# Patient Record
Sex: MALE | Race: WHITE | Employment: OTHER | URBAN - METROPOLITAN AREA
[De-identification: names, ages, dates, MRNs, and addresses within clinical notes are randomized per-mention and may not be internally consistent; named-entity substitution may affect disease eponyms.]

---

## 2023-05-13 ENCOUNTER — HOSPITAL ENCOUNTER (OUTPATIENT)
Dept: RADIOLOGY | Facility: MEDICAL CENTER | Age: 48
End: 2023-05-13

## 2024-05-08 ENCOUNTER — APPOINTMENT (OUTPATIENT)
Dept: RADIOLOGY | Facility: MEDICAL CENTER | Age: 49
DRG: 661 | End: 2024-05-08
Attending: EMERGENCY MEDICINE
Payer: COMMERCIAL

## 2024-05-08 ENCOUNTER — HOSPITAL ENCOUNTER (INPATIENT)
Facility: MEDICAL CENTER | Age: 49
LOS: 3 days | DRG: 661 | End: 2024-05-11
Attending: EMERGENCY MEDICINE | Admitting: HOSPITALIST
Payer: COMMERCIAL

## 2024-05-08 DIAGNOSIS — D72.829 LEUKOCYTOSIS, UNSPECIFIED TYPE: ICD-10-CM

## 2024-05-08 DIAGNOSIS — R73.9 HYPERGLYCEMIA: ICD-10-CM

## 2024-05-08 DIAGNOSIS — R03.0 ELEVATED BLOOD PRESSURE READING: ICD-10-CM

## 2024-05-08 DIAGNOSIS — N20.1 URETERAL STONE: ICD-10-CM

## 2024-05-08 PROBLEM — E86.0 DEHYDRATION: Status: ACTIVE | Noted: 2024-05-08

## 2024-05-08 LAB
ANION GAP SERPL CALC-SCNC: 11 MMOL/L (ref 7–16)
APPEARANCE UR: ABNORMAL
BACTERIA #/AREA URNS HPF: ABNORMAL /HPF
BASOPHILS # BLD AUTO: 0.8 % (ref 0–1.8)
BASOPHILS # BLD: 0.11 K/UL (ref 0–0.12)
BILIRUB UR QL STRIP.AUTO: ABNORMAL
BUN SERPL-MCNC: 15 MG/DL (ref 8–22)
CALCIUM SERPL-MCNC: 9.1 MG/DL (ref 8.4–10.2)
CHLORIDE SERPL-SCNC: 98 MMOL/L (ref 96–112)
CO2 SERPL-SCNC: 27 MMOL/L (ref 20–33)
COLOR UR: ABNORMAL
CREAT SERPL-MCNC: 1.26 MG/DL (ref 0.5–1.4)
EOSINOPHIL # BLD AUTO: 0.23 K/UL (ref 0–0.51)
EOSINOPHIL NFR BLD: 1.6 % (ref 0–6.9)
EPI CELLS #/AREA URNS HPF: ABNORMAL /HPF
ERYTHROCYTE [DISTWIDTH] IN BLOOD BY AUTOMATED COUNT: 36.5 FL (ref 35.9–50)
GFR SERPLBLD CREATININE-BSD FMLA CKD-EPI: 70 ML/MIN/1.73 M 2
GLUCOSE SERPL-MCNC: 134 MG/DL (ref 65–99)
GLUCOSE UR STRIP.AUTO-MCNC: NEGATIVE MG/DL
HCT VFR BLD AUTO: 58.5 % (ref 42–52)
HGB BLD-MCNC: 19.7 G/DL (ref 14–18)
IMM GRANULOCYTES # BLD AUTO: 0.06 K/UL (ref 0–0.11)
IMM GRANULOCYTES NFR BLD AUTO: 0.4 % (ref 0–0.9)
KETONES UR STRIP.AUTO-MCNC: ABNORMAL MG/DL
LEUKOCYTE ESTERASE UR QL STRIP.AUTO: NEGATIVE
LYMPHOCYTES # BLD AUTO: 1.88 K/UL (ref 1–4.8)
LYMPHOCYTES NFR BLD: 13 % (ref 22–41)
MCH RBC QN AUTO: 26.6 PG (ref 27–33)
MCHC RBC AUTO-ENTMCNC: 33.7 G/DL (ref 32.3–36.5)
MCV RBC AUTO: 78.9 FL (ref 81.4–97.8)
MICRO URNS: ABNORMAL
MONOCYTES # BLD AUTO: 1.16 K/UL (ref 0–0.85)
MONOCYTES NFR BLD AUTO: 8 % (ref 0–13.4)
MUCOUS THREADS #/AREA URNS HPF: ABNORMAL /HPF
NEUTROPHILS # BLD AUTO: 11.04 K/UL (ref 1.82–7.42)
NEUTROPHILS NFR BLD: 76.2 % (ref 44–72)
NITRITE UR QL STRIP.AUTO: NEGATIVE
NRBC # BLD AUTO: 0 K/UL
NRBC BLD-RTO: 0 /100 WBC (ref 0–0.2)
PH UR STRIP.AUTO: 6 [PH] (ref 5–8)
PLATELET # BLD AUTO: 117 K/UL (ref 164–446)
PMV BLD AUTO: 9.6 FL (ref 9–12.9)
POTASSIUM SERPL-SCNC: 4.4 MMOL/L (ref 3.6–5.5)
PROT UR QL STRIP: 30 MG/DL
RBC # BLD AUTO: 7.41 M/UL (ref 4.7–6.1)
RBC # URNS HPF: ABNORMAL /HPF
RBC UR QL AUTO: ABNORMAL
SODIUM SERPL-SCNC: 136 MMOL/L (ref 135–145)
SP GR UR STRIP.AUTO: >=1.03
UNIDENT CRYS URNS QL MICRO: ABNORMAL /HPF
WBC # BLD AUTO: 14.5 K/UL (ref 4.8–10.8)
WBC #/AREA URNS HPF: ABNORMAL /HPF

## 2024-05-08 PROCEDURE — 99223 1ST HOSP IP/OBS HIGH 75: CPT | Performed by: HOSPITALIST

## 2024-05-08 RX ORDER — OXYCODONE HYDROCHLORIDE 10 MG/1
10 TABLET ORAL
Status: DISCONTINUED | OUTPATIENT
Start: 2024-05-08 | End: 2024-05-11 | Stop reason: HOSPADM

## 2024-05-08 RX ORDER — OXYCODONE HYDROCHLORIDE 5 MG/1
5 TABLET ORAL
Status: DISCONTINUED | OUTPATIENT
Start: 2024-05-08 | End: 2024-05-11 | Stop reason: HOSPADM

## 2024-05-08 RX ORDER — AMOXICILLIN 250 MG
2 CAPSULE ORAL 2 TIMES DAILY
Status: DISCONTINUED | OUTPATIENT
Start: 2024-05-08 | End: 2024-05-11 | Stop reason: HOSPADM

## 2024-05-08 RX ORDER — CALCIUM CARBONATE 500 MG/1
500 TABLET, CHEWABLE ORAL 2 TIMES DAILY PRN
Status: DISCONTINUED | OUTPATIENT
Start: 2024-05-08 | End: 2024-05-11 | Stop reason: HOSPADM

## 2024-05-08 RX ORDER — SODIUM CHLORIDE, SODIUM LACTATE, POTASSIUM CHLORIDE, CALCIUM CHLORIDE 600; 310; 30; 20 MG/100ML; MG/100ML; MG/100ML; MG/100ML
1000 INJECTION, SOLUTION INTRAVENOUS ONCE
Status: COMPLETED | OUTPATIENT
Start: 2024-05-08 | End: 2024-05-08

## 2024-05-08 RX ORDER — ACETAMINOPHEN 325 MG/1
650 TABLET ORAL EVERY 6 HOURS PRN
Status: DISCONTINUED | OUTPATIENT
Start: 2024-05-08 | End: 2024-05-11 | Stop reason: HOSPADM

## 2024-05-08 RX ORDER — TAMSULOSIN HYDROCHLORIDE 0.4 MG/1
0.4 CAPSULE ORAL
Status: DISCONTINUED | OUTPATIENT
Start: 2024-05-09 | End: 2024-05-11 | Stop reason: HOSPADM

## 2024-05-08 RX ORDER — SODIUM CHLORIDE, SODIUM LACTATE, POTASSIUM CHLORIDE, CALCIUM CHLORIDE 600; 310; 30; 20 MG/100ML; MG/100ML; MG/100ML; MG/100ML
1000 INJECTION, SOLUTION INTRAVENOUS ONCE
Status: COMPLETED | OUTPATIENT
Start: 2024-05-08 | End: 2024-05-09

## 2024-05-08 RX ORDER — TAMSULOSIN HYDROCHLORIDE 0.4 MG/1
0.4 CAPSULE ORAL EVERY EVENING
Status: ON HOLD | COMMUNITY
End: 2024-05-11

## 2024-05-08 RX ORDER — ONDANSETRON 4 MG/1
4 TABLET, ORALLY DISINTEGRATING ORAL EVERY 4 HOURS PRN
Status: DISCONTINUED | OUTPATIENT
Start: 2024-05-08 | End: 2024-05-11 | Stop reason: HOSPADM

## 2024-05-08 RX ORDER — SODIUM CHLORIDE 9 MG/ML
INJECTION, SOLUTION INTRAVENOUS CONTINUOUS
Status: DISCONTINUED | OUTPATIENT
Start: 2024-05-08 | End: 2024-05-10

## 2024-05-08 RX ORDER — TAMSULOSIN HYDROCHLORIDE 0.4 MG/1
0.4 CAPSULE ORAL DAILY
Qty: 30 CAPSULE | Refills: 0 | Status: SHIPPED | OUTPATIENT
Start: 2024-05-08

## 2024-05-08 RX ORDER — TRAMADOL HYDROCHLORIDE 50 MG/1
50 TABLET ORAL EVERY 6 HOURS PRN
COMMUNITY

## 2024-05-08 RX ORDER — ONDANSETRON 2 MG/ML
4 INJECTION INTRAMUSCULAR; INTRAVENOUS EVERY 4 HOURS PRN
Status: DISCONTINUED | OUTPATIENT
Start: 2024-05-08 | End: 2024-05-11 | Stop reason: HOSPADM

## 2024-05-08 RX ORDER — PROMETHAZINE HYDROCHLORIDE 25 MG/1
12.5-25 TABLET ORAL EVERY 4 HOURS PRN
Status: DISCONTINUED | OUTPATIENT
Start: 2024-05-08 | End: 2024-05-11 | Stop reason: HOSPADM

## 2024-05-08 RX ORDER — DIAZEPAM 5 MG/ML
5 INJECTION, SOLUTION INTRAMUSCULAR; INTRAVENOUS ONCE
Status: COMPLETED | OUTPATIENT
Start: 2024-05-08 | End: 2024-05-08

## 2024-05-08 RX ORDER — TAMSULOSIN HYDROCHLORIDE 0.4 MG/1
0.4 CAPSULE ORAL ONCE
Status: COMPLETED | OUTPATIENT
Start: 2024-05-08 | End: 2024-05-08

## 2024-05-08 RX ORDER — PROMETHAZINE HYDROCHLORIDE 25 MG/1
12.5-25 SUPPOSITORY RECTAL EVERY 4 HOURS PRN
Status: DISCONTINUED | OUTPATIENT
Start: 2024-05-08 | End: 2024-05-11 | Stop reason: HOSPADM

## 2024-05-08 RX ORDER — PROCHLORPERAZINE EDISYLATE 5 MG/ML
5-10 INJECTION INTRAMUSCULAR; INTRAVENOUS EVERY 4 HOURS PRN
Status: DISCONTINUED | OUTPATIENT
Start: 2024-05-08 | End: 2024-05-11 | Stop reason: HOSPADM

## 2024-05-08 RX ORDER — KETOROLAC TROMETHAMINE 15 MG/ML
15 INJECTION, SOLUTION INTRAMUSCULAR; INTRAVENOUS ONCE
Status: DISCONTINUED | OUTPATIENT
Start: 2024-05-08 | End: 2024-05-08

## 2024-05-08 RX ORDER — POLYETHYLENE GLYCOL 3350 17 G/17G
1 POWDER, FOR SOLUTION ORAL
Status: DISCONTINUED | OUTPATIENT
Start: 2024-05-08 | End: 2024-05-11 | Stop reason: HOSPADM

## 2024-05-08 RX ORDER — KETOROLAC TROMETHAMINE 15 MG/ML
15 INJECTION, SOLUTION INTRAMUSCULAR; INTRAVENOUS ONCE
Status: COMPLETED | OUTPATIENT
Start: 2024-05-08 | End: 2024-05-08

## 2024-05-08 RX ORDER — HYDROMORPHONE HYDROCHLORIDE 1 MG/ML
0.5 INJECTION, SOLUTION INTRAMUSCULAR; INTRAVENOUS; SUBCUTANEOUS
Status: DISCONTINUED | OUTPATIENT
Start: 2024-05-08 | End: 2024-05-11 | Stop reason: HOSPADM

## 2024-05-08 RX ORDER — ONDANSETRON 2 MG/ML
4 INJECTION INTRAMUSCULAR; INTRAVENOUS ONCE
Status: COMPLETED | OUTPATIENT
Start: 2024-05-08 | End: 2024-05-08

## 2024-05-08 RX ADMIN — TAMSULOSIN HYDROCHLORIDE 0.4 MG: 0.4 CAPSULE ORAL at 17:09

## 2024-05-08 RX ADMIN — DIAZEPAM 5 MG: 5 INJECTION, SOLUTION INTRAMUSCULAR; INTRAVENOUS at 15:56

## 2024-05-08 RX ADMIN — MORPHINE SULFATE 6 MG: 10 INJECTION INTRAVENOUS at 17:08

## 2024-05-08 RX ADMIN — KETOROLAC TROMETHAMINE 15 MG: 15 INJECTION, SOLUTION INTRAMUSCULAR; INTRAVENOUS at 15:51

## 2024-05-08 RX ADMIN — SODIUM CHLORIDE, POTASSIUM CHLORIDE, SODIUM LACTATE AND CALCIUM CHLORIDE 1000 ML: 600; 310; 30; 20 INJECTION, SOLUTION INTRAVENOUS at 15:45

## 2024-05-08 RX ADMIN — ONDANSETRON 4 MG: 2 INJECTION INTRAMUSCULAR; INTRAVENOUS at 17:08

## 2024-05-08 RX ADMIN — SODIUM CHLORIDE: 9 INJECTION, SOLUTION INTRAVENOUS at 19:03

## 2024-05-08 RX ADMIN — SODIUM CHLORIDE, POTASSIUM CHLORIDE, SODIUM LACTATE AND CALCIUM CHLORIDE 1000 ML: 600; 310; 30; 20 INJECTION, SOLUTION INTRAVENOUS at 17:11

## 2024-05-08 ASSESSMENT — LIFESTYLE VARIABLES
DOES PATIENT WANT TO STOP DRINKING: NO
TOTAL SCORE: 0
ON A TYPICAL DAY WHEN YOU DRINK ALCOHOL HOW MANY DRINKS DO YOU HAVE: 0
HAVE PEOPLE ANNOYED YOU BY CRITICIZING YOUR DRINKING: NO
HAVE YOU EVER FELT YOU SHOULD CUT DOWN ON YOUR DRINKING: NO
TOTAL SCORE: 0
HOW MANY TIMES IN THE PAST YEAR HAVE YOU HAD 5 OR MORE DRINKS IN A DAY: 0
EVER HAD A DRINK FIRST THING IN THE MORNING TO STEADY YOUR NERVES TO GET RID OF A HANGOVER: NO
TOTAL SCORE: 0
AVERAGE NUMBER OF DAYS PER WEEK YOU HAVE A DRINK CONTAINING ALCOHOL: 0
EVER FELT BAD OR GUILTY ABOUT YOUR DRINKING: NO
CONSUMPTION TOTAL: NEGATIVE
ALCOHOL_USE: NO

## 2024-05-08 ASSESSMENT — COGNITIVE AND FUNCTIONAL STATUS - GENERAL
SUGGESTED CMS G CODE MODIFIER DAILY ACTIVITY: CH
SUGGESTED CMS G CODE MODIFIER MOBILITY: CH
MOBILITY SCORE: 24
DAILY ACTIVITIY SCORE: 24

## 2024-05-08 ASSESSMENT — ENCOUNTER SYMPTOMS
STRIDOR: 0
EYE DISCHARGE: 0
FEVER: 0
NERVOUS/ANXIOUS: 0
CHILLS: 0
SHORTNESS OF BREATH: 0
COUGH: 0
FOCAL WEAKNESS: 0
ABDOMINAL PAIN: 0
EYE REDNESS: 0
FLANK PAIN: 0
MYALGIAS: 0
VOMITING: 0
BRUISES/BLEEDS EASILY: 0

## 2024-05-08 ASSESSMENT — PATIENT HEALTH QUESTIONNAIRE - PHQ9
1. LITTLE INTEREST OR PLEASURE IN DOING THINGS: NOT AT ALL
SUM OF ALL RESPONSES TO PHQ9 QUESTIONS 1 AND 2: 0
2. FEELING DOWN, DEPRESSED, IRRITABLE, OR HOPELESS: NOT AT ALL

## 2024-05-08 ASSESSMENT — PAIN DESCRIPTION - PAIN TYPE
TYPE: ACUTE PAIN
TYPE: ACUTE PAIN

## 2024-05-08 NOTE — ED TRIAGE NOTES
Pt ambulates to triage with c/o left sided flank pain with difficulty urinating for he past 3 weeks. He states he has a hx of kidney stones and feels like this is the same.

## 2024-05-08 NOTE — ED PROVIDER NOTES
ED Provider Note    CHIEF COMPLAINT  Chief Complaint   Patient presents with    Flank Pain       EXTERNAL RECORDS REVIEWED  External records show kidney stone related visit from 2003 within our system, where the patient reported lithotripsy 1 week prior in [place], but records at that time showed no visits to that facility in the prior several years.  PDMP results show 2 prescriptions filled last month, 1 in [place], 1 in [place].    HPI/ROS  LIMITATION TO HISTORY   Pt seems distracted by discomfort.    OUTSIDE HISTORIAN(S):      Daniel Gibbs is a 48 y.o. male who presents to the emergency department reporting left flank and suprapubic pain, reporting flank pain with dysuria intermittently for about the past 3 weeks, worse in the past week, and then severe today.  He reports an urgent care CT about a week ago demonstrating a 3 mm ureteral stone with bilateral kidney stones.  No records are available. He reports he was prescribed Flomax and muscle relaxants.  He denies fevers, nausea or vomiting.  He has not had any changes in bowel habits.  He reports an essentially lifelong history of kidney stones, says he has never been instructed to see a urologist. +N/V, no fver/chills. Limited historian due to discomfort.    PAST MEDICAL HISTORY   has a past medical history of Asthma.    SURGICAL HISTORY  patient denies any surgical history    FAMILY HISTORY  History reviewed. No pertinent family history.    SOCIAL HISTORY  Social History     Tobacco Use    Smoking status: Every Day     Current packs/day: 0.50     Types: Cigarettes    Smokeless tobacco: Not on file   Substance and Sexual Activity    Alcohol use: No    Drug use: Not on file    Sexual activity: Not on file       CURRENT MEDICATIONS  Home Medications       Reviewed by Kaitlynn Byrd R.N. (Registered Nurse) on 05/08/24 at 1811  Med List Status: Complete     Medication Last Dose Status   tamsulosin (FLOMAX) 0.4 MG capsule 5/7/2024 Active   traMADol (ULTRAM)  "50 MG Tab >3 days Active                    ALLERGIES  No Known Allergies      PHYSICAL EXAM  VITAL SIGNS: /70   Pulse 83   Temp 35.9 °C (96.7 °F) (Oral)   Resp 17   Ht 1.778 m (5' 10\")   Wt 111 kg (244 lb 11.4 oz)   SpO2 91%   BMI 35.11 kg/m²   Pulse ox interpretation: I interpret this pulse ox as normal.  Constitutional: Alert in obvious distress.  HENT: No signs of trauma, Bilateral external ears normal, Nose normal.   Eyes: Conjunctiva normal, Non-icteric.   Neck: Normal range of motion, Supple, No stridor.   Lymphatic: No lymphadenopathy noted.   Cardiovascular: Regular rate and rhythm, no murmurs.   Thorax & Lungs: Normal breath sounds, No respiratory distress, No wheezing  Abdomen: Bowel sounds normal, Soft, No tenderness, No masses, No pulsatile masses. No peritoneal signs.  Skin: Warm, Dry, No erythema, No rash.   Back: Left CVA tenderness.  Extremities: Intact distal pulses, No edema, No cyanosis.  Musculoskeletal: Good range of motion in all major joints. No or major deformities noted.   Neurologic: Alert , Normal motor function, Normal sensory function, No focal deficits noted.   Psychiatric: Affect dramatic, judgment normal, Mood normal.         EKG/LABS  Labs Reviewed   CBC WITH DIFFERENTIAL - Abnormal; Notable for the following components:       Result Value    WBC 14.5 (*)     RBC 7.41 (*)     Hemoglobin 19.7 (*)     Hematocrit 58.5 (*)     MCV 78.9 (*)     MCH 26.6 (*)     Platelet Count 117 (*)     Neutrophils-Polys 76.20 (*)     Lymphocytes 13.00 (*)     Neutrophils (Absolute) 11.04 (*)     Monos (Absolute) 1.16 (*)     All other components within normal limits   URINALYSIS - Abnormal; Notable for the following components:    Character Cloudy (*)     Ketones Trace (*)     Protein 30 (*)     Bilirubin Small (*)     Occult Blood Large (*)     All other components within normal limits   BASIC METABOLIC PANEL - Abnormal; Notable for the following components:    Glucose 134 (*)     All " other components within normal limits   URINE MICROSCOPIC (W/UA) - Abnormal; Notable for the following components:    WBC 0-2 (*)     -150 (*)     Bacteria Few (*)     All other components within normal limits   CBC WITHOUT DIFFERENTIAL - Abnormal; Notable for the following components:    RBC 6.51 (*)     Hematocrit 52.2 (*)     MCV 80.2 (*)     MCH 26.6 (*)     Platelet Count 103 (*)     All other components within normal limits   COMP METABOLIC PANEL - Abnormal; Notable for the following components:    Glucose 177 (*)     AST(SGOT) 199 (*)     ALT(SGPT) 193 (*)     Alkaline Phosphatase 114 (*)     Total Protein 5.7 (*)     All other components within normal limits   ESTIMATED GFR   MAGNESIUM   TROPONIN   ESTIMATED GFR   HEMOGLOBIN A1C       I have independently interpreted this EKG    RADIOLOGY/PROCEDURES   I have independently interpreted the diagnostic imaging associated with this visit and am waiting the final reading from the radiologist.     My preliminary interpretation is as follows: Bilateral stones.  Distal left stone.    Radiologist interpretation:  CT-RENAL COLIC EVALUATION(A/P W/O)   Final Result         1. Bilateral nephrolithiasis.   2. There is a 5 mm stone in the distal left ureter with mild left-sided hydroureteronephrosis.   3. Low-attenuation renal lesions may represent cysts. This could be confirmed with ultrasound.   4. There is no evidence for bowel obstruction, diverticulitis, or appendicitis.          COURSE & MEDICAL DECISION MAKING    ASSESSMENT, COURSE AND PLAN  Care Narrative:     3:35 PM patient evaluated the bedside.  He reports a lifelong history of kidney stones.  There are no confirmation of kidney stones in our system, except for a 2003 CT scan which showed a punctate calcification in the left kidney without evidence of other kidney stones, hydronephrosis, stranding, or signs of obstruction.  The patient has a history of cholecystectomy.  He reports flank pain and some  difficulty voiding for the past several weeks.  He reports this is consistent with his history of kidney stones.  There are some inconsistencies to the timeline he reports, and I do not see any evidence of the reported recent CT scan or urgent care visit.  Differential includes but is not limited to kidney stones, ureteral stones, renal colic, urinary tract infection.  Pain is in the left flank.  I do not suspect appendicitis.  There is no evidence of systemic illness based on vital signs.  The patient be evaluated with CBC, BMP, urinalysis, noncontrast CT, stone protocol.  Will be treated with IV fluids, Toradol, Valium.    5:00 PM this patient has a significant leukocytosis of uncertain etiology.  He has not yet been able to provide a urine sample.  He is on his third round of pain medications, remains quite uncomfortable, and has a large distal ureteral stone.  I think he needs to be admitted for pain control, exclusion of infected kidney stone, aggressive hydration to help pass the stone, and possible urology consultation.  I paged and spoken with Dr. Bledsoe the hospitalist, who agrees to admit, and with Dr. Saleh, on-call for urology, who will have the patient evaluated by his team, likely in the morning, unless the patient deteriorates in the meantime.    Hydration: Based on the patient's presentation of Inability to take oral fluids and Other concern for infection the patient was given IV fluids. IV Hydration was used because oral hydration failed due to need to remain n.p.o. for possible procedure. Upon recheck following hydration, the patient was improving, with return of urine output.      ADDITIONAL PROBLEMS MANAGED  Lifelong history of kidney stones with no urology consultation at any time, per patient.    DISPOSITION AND DISCUSSIONS  I have discussed management of the patient with the following physicians and RONNIE's: Urology, and the admitting hospitalist, as above.    FINAL DIAGNOSIS  1. Ureteral  stone    2. Leukocytosis, unspecified type           Electronically signed by: Olivier Wade M.D., 5/8/2024 3:31 PM

## 2024-05-09 ENCOUNTER — ANESTHESIA EVENT (OUTPATIENT)
Dept: SURGERY | Facility: MEDICAL CENTER | Age: 49
DRG: 661 | End: 2024-05-09
Payer: COMMERCIAL

## 2024-05-09 ENCOUNTER — ANESTHESIA (OUTPATIENT)
Dept: SURGERY | Facility: MEDICAL CENTER | Age: 49
DRG: 661 | End: 2024-05-09
Payer: COMMERCIAL

## 2024-05-09 ENCOUNTER — APPOINTMENT (OUTPATIENT)
Dept: RADIOLOGY | Facility: MEDICAL CENTER | Age: 49
DRG: 661 | End: 2024-05-09
Attending: UROLOGY
Payer: COMMERCIAL

## 2024-05-09 PROBLEM — Z71.6 TOBACCO ABUSE COUNSELING: Status: ACTIVE | Noted: 2024-05-09

## 2024-05-09 PROBLEM — R79.89 ELEVATED LFTS: Status: ACTIVE | Noted: 2024-05-09

## 2024-05-09 PROBLEM — R73.9 HYPERGLYCEMIA: Status: ACTIVE | Noted: 2024-05-09

## 2024-05-09 PROBLEM — Z71.89 ADVANCE CARE PLANNING: Status: ACTIVE | Noted: 2024-05-09

## 2024-05-09 PROBLEM — D69.6 THROMBOCYTOPENIA (HCC): Status: ACTIVE | Noted: 2024-05-09

## 2024-05-09 LAB
ALBUMIN SERPL BCP-MCNC: 3.2 G/DL (ref 3.2–4.9)
ALBUMIN/GLOB SERPL: 1.3 G/DL
ALP SERPL-CCNC: 114 U/L (ref 30–99)
ALT SERPL-CCNC: 193 U/L (ref 2–50)
ANION GAP SERPL CALC-SCNC: 8 MMOL/L (ref 7–16)
AST SERPL-CCNC: 199 U/L (ref 12–45)
BILIRUB SERPL-MCNC: 0.3 MG/DL (ref 0.1–1.5)
BUN SERPL-MCNC: 15 MG/DL (ref 8–22)
CALCIUM ALBUM COR SERPL-MCNC: 9 MG/DL (ref 8.5–10.5)
CALCIUM SERPL-MCNC: 8.4 MG/DL (ref 8.4–10.2)
CHLORIDE SERPL-SCNC: 99 MMOL/L (ref 96–112)
CO2 SERPL-SCNC: 30 MMOL/L (ref 20–33)
CREAT SERPL-MCNC: 1.37 MG/DL (ref 0.5–1.4)
EKG IMPRESSION: NORMAL
ERYTHROCYTE [DISTWIDTH] IN BLOOD BY AUTOMATED COUNT: 36.5 FL (ref 35.9–50)
EST. AVERAGE GLUCOSE BLD GHB EST-MCNC: 137 MG/DL
GFR SERPLBLD CREATININE-BSD FMLA CKD-EPI: 63 ML/MIN/1.73 M 2
GLOBULIN SER CALC-MCNC: 2.5 G/DL (ref 1.9–3.5)
GLUCOSE SERPL-MCNC: 177 MG/DL (ref 65–99)
HBA1C MFR BLD: 6.4 % (ref 4–5.6)
HCT VFR BLD AUTO: 52.2 % (ref 42–52)
HGB BLD-MCNC: 17.3 G/DL (ref 14–18)
MAGNESIUM SERPL-MCNC: 2 MG/DL (ref 1.5–2.5)
MCH RBC QN AUTO: 26.6 PG (ref 27–33)
MCHC RBC AUTO-ENTMCNC: 33.1 G/DL (ref 32.3–36.5)
MCV RBC AUTO: 80.2 FL (ref 81.4–97.8)
PATHOLOGY CONSULT NOTE: NORMAL
PLATELET # BLD AUTO: 103 K/UL (ref 164–446)
PMV BLD AUTO: 9.7 FL (ref 9–12.9)
POTASSIUM SERPL-SCNC: 4 MMOL/L (ref 3.6–5.5)
PROT SERPL-MCNC: 5.7 G/DL (ref 6–8.2)
RBC # BLD AUTO: 6.51 M/UL (ref 4.7–6.1)
SODIUM SERPL-SCNC: 137 MMOL/L (ref 135–145)
TROPONIN T SERPL-MCNC: 13 NG/L (ref 6–19)
WBC # BLD AUTO: 9.6 K/UL (ref 4.8–10.8)

## 2024-05-09 PROCEDURE — 99407 BEHAV CHNG SMOKING > 10 MIN: CPT | Performed by: INTERNAL MEDICINE

## 2024-05-09 PROCEDURE — 93010 ELECTROCARDIOGRAM REPORT: CPT | Performed by: INTERNAL MEDICINE

## 2024-05-09 PROCEDURE — 0T778DZ DILATION OF LEFT URETER WITH INTRALUMINAL DEVICE, VIA NATURAL OR ARTIFICIAL OPENING ENDOSCOPIC: ICD-10-PCS | Performed by: UROLOGY

## 2024-05-09 PROCEDURE — 0TC78ZZ EXTIRPATION OF MATTER FROM LEFT URETER, VIA NATURAL OR ARTIFICIAL OPENING ENDOSCOPIC: ICD-10-PCS | Performed by: UROLOGY

## 2024-05-09 PROCEDURE — 99497 ADVNCD CARE PLAN 30 MIN: CPT | Performed by: INTERNAL MEDICINE

## 2024-05-09 PROCEDURE — 99233 SBSQ HOSP IP/OBS HIGH 50: CPT | Mod: 25 | Performed by: INTERNAL MEDICINE

## 2024-05-09 DEVICE — STENT UROLOGICAL POLARIS 6X26  ULTRA: Type: IMPLANTABLE DEVICE | Site: URETER | Status: FUNCTIONAL

## 2024-05-09 RX ORDER — OXYCODONE HCL 5 MG/5 ML
5 SOLUTION, ORAL ORAL
Status: DISCONTINUED | OUTPATIENT
Start: 2024-05-09 | End: 2024-05-09 | Stop reason: HOSPADM

## 2024-05-09 RX ORDER — MAGNESIUM HYDROXIDE 1200 MG/15ML
LIQUID ORAL
Status: COMPLETED | OUTPATIENT
Start: 2024-05-09 | End: 2024-05-09

## 2024-05-09 RX ORDER — MIDAZOLAM HYDROCHLORIDE 1 MG/ML
INJECTION INTRAMUSCULAR; INTRAVENOUS PRN
Status: DISCONTINUED | OUTPATIENT
Start: 2024-05-09 | End: 2024-05-09 | Stop reason: SURG

## 2024-05-09 RX ORDER — LIDOCAINE HYDROCHLORIDE 20 MG/ML
INJECTION, SOLUTION EPIDURAL; INFILTRATION; INTRACAUDAL; PERINEURAL PRN
Status: DISCONTINUED | OUTPATIENT
Start: 2024-05-09 | End: 2024-05-09 | Stop reason: SURG

## 2024-05-09 RX ORDER — SODIUM CHLORIDE, SODIUM LACTATE, POTASSIUM CHLORIDE, CALCIUM CHLORIDE 600; 310; 30; 20 MG/100ML; MG/100ML; MG/100ML; MG/100ML
INJECTION, SOLUTION INTRAVENOUS CONTINUOUS
Status: DISCONTINUED | OUTPATIENT
Start: 2024-05-09 | End: 2024-05-09

## 2024-05-09 RX ORDER — ONDANSETRON 2 MG/ML
4 INJECTION INTRAMUSCULAR; INTRAVENOUS
Status: DISCONTINUED | OUTPATIENT
Start: 2024-05-09 | End: 2024-05-09 | Stop reason: HOSPADM

## 2024-05-09 RX ORDER — ONDANSETRON 2 MG/ML
INJECTION INTRAMUSCULAR; INTRAVENOUS PRN
Status: DISCONTINUED | OUTPATIENT
Start: 2024-05-09 | End: 2024-05-09 | Stop reason: SURG

## 2024-05-09 RX ORDER — SODIUM CHLORIDE, SODIUM LACTATE, POTASSIUM CHLORIDE, CALCIUM CHLORIDE 600; 310; 30; 20 MG/100ML; MG/100ML; MG/100ML; MG/100ML
INJECTION, SOLUTION INTRAVENOUS CONTINUOUS
Status: DISCONTINUED | OUTPATIENT
Start: 2024-05-09 | End: 2024-05-09 | Stop reason: HOSPADM

## 2024-05-09 RX ORDER — OXYCODONE HCL 5 MG/5 ML
10 SOLUTION, ORAL ORAL
Status: DISCONTINUED | OUTPATIENT
Start: 2024-05-09 | End: 2024-05-09 | Stop reason: HOSPADM

## 2024-05-09 RX ORDER — DIPHENHYDRAMINE HYDROCHLORIDE 50 MG/ML
12.5 INJECTION INTRAMUSCULAR; INTRAVENOUS
Status: DISCONTINUED | OUTPATIENT
Start: 2024-05-09 | End: 2024-05-09 | Stop reason: HOSPADM

## 2024-05-09 RX ORDER — CEFAZOLIN SODIUM 1 G/3ML
INJECTION, POWDER, FOR SOLUTION INTRAMUSCULAR; INTRAVENOUS PRN
Status: DISCONTINUED | OUTPATIENT
Start: 2024-05-09 | End: 2024-05-09 | Stop reason: SURG

## 2024-05-09 RX ORDER — HALOPERIDOL 5 MG/ML
1 INJECTION INTRAMUSCULAR
Status: DISCONTINUED | OUTPATIENT
Start: 2024-05-09 | End: 2024-05-09 | Stop reason: HOSPADM

## 2024-05-09 RX ORDER — DEXAMETHASONE SODIUM PHOSPHATE 4 MG/ML
INJECTION, SOLUTION INTRA-ARTICULAR; INTRALESIONAL; INTRAMUSCULAR; INTRAVENOUS; SOFT TISSUE PRN
Status: DISCONTINUED | OUTPATIENT
Start: 2024-05-09 | End: 2024-05-09 | Stop reason: SURG

## 2024-05-09 RX ADMIN — HYDROMORPHONE HYDROCHLORIDE 0.5 MG: 1 INJECTION, SOLUTION INTRAMUSCULAR; INTRAVENOUS; SUBCUTANEOUS at 15:28

## 2024-05-09 RX ADMIN — HYDROMORPHONE HYDROCHLORIDE 0.5 MG: 1 INJECTION, SOLUTION INTRAMUSCULAR; INTRAVENOUS; SUBCUTANEOUS at 12:24

## 2024-05-09 RX ADMIN — MIDAZOLAM HYDROCHLORIDE 2 MG: 1 INJECTION, SOLUTION INTRAMUSCULAR; INTRAVENOUS at 10:23

## 2024-05-09 RX ADMIN — OXYCODONE HYDROCHLORIDE 5 MG: 5 TABLET ORAL at 14:24

## 2024-05-09 RX ADMIN — CEFAZOLIN 2 G: 1 INJECTION, POWDER, FOR SOLUTION INTRAMUSCULAR; INTRAVENOUS at 10:23

## 2024-05-09 RX ADMIN — FENTANYL CITRATE 100 MCG: 50 INJECTION, SOLUTION INTRAMUSCULAR; INTRAVENOUS at 10:23

## 2024-05-09 RX ADMIN — OXYCODONE HYDROCHLORIDE 5 MG: 5 TABLET ORAL at 19:50

## 2024-05-09 RX ADMIN — SENNOSIDES AND DOCUSATE SODIUM 2 TABLET: 50; 8.6 TABLET ORAL at 17:13

## 2024-05-09 RX ADMIN — SODIUM CHLORIDE: 9 INJECTION, SOLUTION INTRAVENOUS at 02:58

## 2024-05-09 RX ADMIN — HYDROMORPHONE HYDROCHLORIDE 0.5 MG: 1 INJECTION, SOLUTION INTRAMUSCULAR; INTRAVENOUS; SUBCUTANEOUS at 07:39

## 2024-05-09 RX ADMIN — DEXAMETHASONE SODIUM PHOSPHATE 4 MG: 4 INJECTION INTRA-ARTICULAR; INTRALESIONAL; INTRAMUSCULAR; INTRAVENOUS; SOFT TISSUE at 10:23

## 2024-05-09 RX ADMIN — PROPOFOL 200 MG: 10 INJECTION, EMULSION INTRAVENOUS at 10:23

## 2024-05-09 RX ADMIN — LIDOCAINE HYDROCHLORIDE 100 MG: 20 INJECTION, SOLUTION EPIDURAL; INFILTRATION; INTRACAUDAL at 10:23

## 2024-05-09 RX ADMIN — SODIUM CHLORIDE: 9 INJECTION, SOLUTION INTRAVENOUS at 16:30

## 2024-05-09 RX ADMIN — SODIUM CHLORIDE, POTASSIUM CHLORIDE, SODIUM LACTATE AND CALCIUM CHLORIDE: 600; 310; 30; 20 INJECTION, SOLUTION INTRAVENOUS at 09:49

## 2024-05-09 RX ADMIN — LIDOCAINE HYDROCHLORIDE 15 ML: 20 SOLUTION ORAL; TOPICAL at 00:05

## 2024-05-09 RX ADMIN — ONDANSETRON 4 MG: 2 INJECTION INTRAMUSCULAR; INTRAVENOUS at 10:23

## 2024-05-09 RX ADMIN — HYDROMORPHONE HYDROCHLORIDE 0.5 MG: 1 INJECTION, SOLUTION INTRAMUSCULAR; INTRAVENOUS; SUBCUTANEOUS at 03:09

## 2024-05-09 ASSESSMENT — COGNITIVE AND FUNCTIONAL STATUS - GENERAL
MOBILITY SCORE: 24
SUGGESTED CMS G CODE MODIFIER DAILY ACTIVITY: CH
DAILY ACTIVITIY SCORE: 24
SUGGESTED CMS G CODE MODIFIER MOBILITY: CH

## 2024-05-09 ASSESSMENT — PAIN DESCRIPTION - PAIN TYPE
TYPE: ACUTE PAIN
TYPE: ACUTE PAIN;SURGICAL PAIN
TYPE: ACUTE PAIN

## 2024-05-09 ASSESSMENT — ENCOUNTER SYMPTOMS
FALLS: 0
PALPITATIONS: 0
CHILLS: 0
VOMITING: 0
SHORTNESS OF BREATH: 0
BACK PAIN: 0
HEARTBURN: 0
DOUBLE VISION: 0
DIZZINESS: 0
HEADACHES: 0
COUGH: 0
BLURRED VISION: 0
FEVER: 0
FLANK PAIN: 1
NERVOUS/ANXIOUS: 0
WEIGHT LOSS: 1
NAUSEA: 0
ABDOMINAL PAIN: 0

## 2024-05-09 ASSESSMENT — FIBROSIS 4 INDEX: FIB4 SCORE: 6.81

## 2024-05-09 ASSESSMENT — LIFESTYLE VARIABLES: SUBSTANCE_ABUSE: 0

## 2024-05-09 NOTE — ANESTHESIA TIME REPORT
Anesthesia Start and Stop Event Times       Date Time Event    5/9/2024 1001 Ready for Procedure     1022 Anesthesia Start     1047 Anesthesia Stop          Responsible Staff  05/09/24      Name Role Begin End    Philippe Negrete M.D. Anesth 1022 1047          Overtime Reason:  no overtime (within assigned shift)    Comments:

## 2024-05-09 NOTE — OR NURSING
1045: To PACU post CYSTOSCOPY - Left. LMA in place.    1105: LMA dc'd, breathing is spontaneous and unlabored. Currently denies pain.    1115: Pt more awake. Taking sips of water. Denies pain.    1132: Pt continues to deny pain or nausea. Meets criteria for transfer to room.

## 2024-05-09 NOTE — ASSESSMENT & PLAN NOTE
Pending A1c  Monitor  We have also ordered a lipid panel for tomorrow.    5/10: Patient had an A1c was reported 6.4.  Patient with prediabetes.  We talked about lifestyle modifications, however the patient stated that he would like to start metformin.  Due to the patient's A1c being 6.4, I think this would be a good idea to start upon discharge.  Will also start the patient on atorvastatin.

## 2024-05-09 NOTE — PROGRESS NOTES
4 Eyes Skin Assessment Completed by CORNELIA Jones and CORNELIA Miller.    Head WDL  Ears Redness and Blanching  Nose WDL  Mouth WDL  Neck WDL  Breast/Chest Acne  Shoulder Blades Redness and Blanching  Spine Redness and Blanching  (R) Arm/Elbow/Hand Redness and Blanching  (L) Arm/Elbow/Hand Redness and Blanching  Abdomen WDL  Groin WDL  Scrotum/Coccyx/Buttocks Redness and Blanching  (R) Leg Scab and Abrasion  (L) Leg Scar and Scab  (R) Heel/Foot/Toe Redness and Blanching  (L) Heel/Foot/Toe Redness and Blanching          Devices In Places Blood Pressure Cuff, Pulse Ox, and Nasal Cannula      Interventions In Place Gray Ear Foams and Pillows    Possible Skin Injury No    Pictures Uploaded Into Epic N/A  Wound Consult Placed N/A  RN Wound Prevention Protocol Ordered No

## 2024-05-09 NOTE — ASSESSMENT & PLAN NOTE
CT imaging showed evidence for 5 mm stone in the left ureter with hydronephrosis  Urology, Dr Saleh, consulted to consider lithotripsy  N.p.o. after midnight.  I will start intravenous fluids, tamsulosin and multimodal pain control    5/9: Patient to undergo urological procedure today, we appreciate further recommendations.  For now we will not start the patient on antibiotics.  The patient denying any dysuria, afebrile and normal white blood cell count.  Will monitor for any signs of infection for now.    5/10:

## 2024-05-09 NOTE — PROGRESS NOTES
4 Eyes Skin Assessment Completed by CORNELIA Noland and CORNELIA Gonzalez.    Head WDL  Ears WDL  Nose WDL  Mouth WDL  Neck WDL  Breast/Chest WDL  Shoulder Blades WDL  Spine WDL  (R) Arm/Elbow/Hand WDL  (L) Arm/Elbow/Hand WDL  Abdomen WDL  Groin WDL  Scrotum/Coccyx/Buttocks WDL  (R) Leg WDL  (L) Leg WDL  (R) Heel/Foot/Toe Dry, calluses   (L) Heel/Foot/Toe Dry, calluses          Devices In Places Pulse Ox      Interventions In Place N/A    Possible Skin Injury No    Pictures Uploaded Into Epic N/A  Wound Consult Placed N/A  RN Wound Prevention Protocol Ordered No

## 2024-05-09 NOTE — ANESTHESIA PREPROCEDURE EVALUATION
Case: [id number] Date/Time: 05/09/24 0955    Procedures:       CYSTOSCOPY (Left)      LITHOTRIPSY, USING LASER (Left)    Location:  OR  / SURGERY [facility]    Surgeons: Mj Chacko M.D.            Relevant Problems   No relevant active problems       Physical Exam    Airway   Mallampati: II  TM distance: >3 FB  Neck ROM: full       Cardiovascular - normal exam  Rhythm: regular  Rate: normal  (-) murmur     Dental - normal exam           Pulmonary - normal exam  Breath sounds clear to auscultation     Abdominal    Neurological - normal exam                   Anesthesia Plan    ASA 2       Plan - general       Airway plan will be LMA                    Informed Consent:

## 2024-05-09 NOTE — OR NURSING
Pt allergies and NPO status verified. Pt verbalizes understanding of pain scale, expected course of stay, and plan of care. Surgical site verified with pt and MD. IV access assessed. All questions answered. Bed in lowest position, call light within reach.

## 2024-05-09 NOTE — ASSESSMENT & PLAN NOTE
5/9: I discussed with patient for at least 11 minutes his tobacco abuse.  The patient states that he smokes 1 pack a day.  We discussed lifestyle modifications.  He understands and he would like to quit.  He states that he has tried Chantix in the past.  We discussed other pharmacological options like bupropion.  He understands and he will follow-up with his PCP as an outpatient.  He says that he does not want nicotine patch while hospitalized.

## 2024-05-09 NOTE — ED NOTES
Medication history reviewed with pt. Med rec is complete.  Allergies reviewed, per pt  Interviewed pt with girlfriend at bedside with permission from pt.    Patient has not had any outpatient antibiotics in the last 30 days.    Pt is not on any anticoagulants

## 2024-05-09 NOTE — H&P (VIEW-ONLY)
"[facility] [place] Consult/H&P Note    Patient's Name/MRN: Daniel Gibbs, [id number]   Room #: 2204/01    Admit Date:5/8/2024  Today's Date: 5/9/2024   Length of stay:  LOS: 1 day      Reason for consult/chief complaint: left flank pain, obstructing stone  ID/HPI: Daniel Gibbs is a 48 y.o. male patient who p/w severe 9/10 flank pain on left side. He has had -N, -V, -F, -C.  This is not his episode of stones. Has not had prior stone procedures in past.  In ER, WBC was 9.6, UA was not c/w infection, and Cr was near baseline at 1.37.  CT was done showing 5mm stone in the distal left ureter with assoc hydro.      Past Medical History:   Past Medical History:   Diagnosis Date    Asthma     childhood        Past Surgical History:   History reviewed. No pertinent surgical history.     Family History:   History reviewed. No pertinent family history.      Social History:   Social History     Tobacco Use    Smoking status: Every Day     Current packs/day: 0.50     Types: Cigarettes   Substance Use Topics    Alcohol use: No      Social History     Social History Narrative    Not on file        Allergies: he Patient has no known allergies.    Medications:   Medications Prior to Admission   Medication Sig Dispense Refill Last Dose    tamsulosin (FLOMAX) 0.4 MG capsule Take 0.4 mg by mouth every evening.   5/7/2024 at 2100    traMADol (ULTRAM) 50 MG Tab Take 50 mg by mouth every 6 hours as needed for Severe Pain.   >3 days at Unknown         Review of Systems  Review of Systems   Constitutional:  Negative for chills and fever.   Respiratory:  Negative for shortness of breath.    Cardiovascular:  Negative for chest pain.   Gastrointestinal:  Negative for abdominal pain, nausea and vomiting.   Genitourinary:  Positive for dysuria, flank pain and urgency.   All other systems reviewed and are negative.       Physical Exam  VITAL SIGNS: /70   Pulse 83   Temp 35.9 °C (96.7 °F) (Oral)   Resp 17   Ht 1.778 m (5' 10\")   Wt " "111 kg (244 lb 11.4 oz)   SpO2 91%   BMI 35.11 kg/m²   GENERAL:  alert, in no acute distress  EYES: EOMI and normal accomodation  Neck: Supple  BACK: No CVA tenderness.   CHEST AND LUNGS: good air entry, no audible wheezes  CARDIOVASCULAR: Rate is regular, no peripheral edema.   ABDOMEN: Abdomen soft, nontender. No masses.  EXTREMITIES: Warm and well perfused without c/c/e  NEURO: No focal deficits  SKIN: Skin color, texture, turgor normal. No rashes, lesions, nor jaundice.      Labs:  Recent Labs     05/08/24  1547 05/09/24  0004   WBC 14.5* 9.6   RBC 7.41* 6.51*   HEMOGLOBIN 19.7* 17.3   HEMATOCRIT 58.5* 52.2*   MCV 78.9* 80.2*   MCH 26.6* 26.6*   MCHC 33.7 33.1   RDW 36.5 36.5   PLATELETCT 117* 103*   MPV 9.6 9.7     Recent Labs     05/08/24  1547 05/09/24  0004   SODIUM 136 137   POTASSIUM 4.4 4.0   CHLORIDE 98 99   CO2 27 30   GLUCOSE 134* 177*   BUN 15 15   CREATININE 1.26 1.37   CALCIUM 9.1 8.4         Glucose:  Lab Results   Component Value Date/Time    GLUCOSE 177 (H) 05/09/2024 12:04 AM    GLUCOSE 134 (H) 05/08/2024 03:47 PM     Coags:  No results found for: \"INR\"      Urinalysis:   Lab Results   Component Value Date/Time    COLORURINE Dark Yellow 05/08/2024 04:57 PM    CLARITY Cloudy (A) 05/08/2024 04:57 PM    SPECGRAVITY >=1.030 05/08/2024 04:57 PM    PHURINE 6.0 05/08/2024 04:57 PM    GLUCOSEUR Negative 05/08/2024 04:57 PM    KETONES Trace (A) 05/08/2024 04:57 PM    NITRITE Negative 05/08/2024 04:57 PM    OCCULTBLOOD Large (A) 05/08/2024 04:57 PM    RBCURINE 100-150 (A) 05/08/2024 04:57 PM    BACTERIA Few (A) 05/08/2024 04:57 PM    EPITHELCELL Few 05/08/2024 04:57 PM       Imaging:                                                     Assessment/Recommendation   48 y.o.male with 5mm left ureteral stone.  He understands the risk of inability to access ureter, the need for second procedures, the possibility of negative ureteroscopy, that he may have stent discomfort until this is removed, bleeding, " infection, ureteral injury or stricture, bladder injury, post op urinary retention requiring jay catheter, and the general pulmonary and cardiovascular risks associated with anesthesia.  After a full discussion of the alternatives risks and benefits of the procedure, the patient consented to proceeding with the planned procedure.     Consent obatined  Add on for Cystoscopy, left ureteroscopy, Laser lithotripsy and JJ stents (CULTS) with DR. Chacko. He is aware of this consult and dictated the plan of care.        Anila Snell, P.A.-C.   [address] [place] [address]   [phone]

## 2024-05-09 NOTE — CARE PLAN
The patient is Stable - Low risk of patient condition declining or worsening    Shift Goals  Clinical Goals: Pt without falls and injury; NPO at midnight; Pain reported tolerable w/ ordered interventions administered.    Progress made toward(s) clinical / shift goals:  Pt without falls and injury; NPO at midnight; Pain reported tolerable w/ ordered interventions administered.    Patient is not progressing towards the following goals:

## 2024-05-09 NOTE — ASSESSMENT & PLAN NOTE
Likely reactive secondary to severe pain  No focal sign of infection at this point   Continue to montior for signs of infection      5/10: I suspect this is reactive from recent procedure.  The patient denying burning, or dysuria he describes more as flank pain from the stent.  Will not start antibiotics at this time.

## 2024-05-09 NOTE — ASSESSMENT & PLAN NOTE
5/9: I discussed with patient for at least 16 minutes further goals of care.  This included CODE STATUS which includes full code and DNR/DNI.  The patient would like to be full code.  We also discussed further treatment goals.  The patient like to have full treatment options.  This includes invasive or noninvasive procedures as needed.  In the event that the patient cannot make decisions for himself he would like his mother to make decisions for him.

## 2024-05-09 NOTE — ASSESSMENT & PLAN NOTE
No known history of primary hypertension  Likely secondary to severe pain  Multimodal pain control  I will start as needed labetalol for extreme hypertension  Consider starting scheduled antihypertensive medications according to blood pressure trend      5/10: Due to the patient having an A1c of 6.4, we have started the patient on lisinopril 10 mg as the blood pressure seems to be consistently high.

## 2024-05-09 NOTE — INTERVAL H&P NOTE
Punctate non obstructing right kidney stones and obstructing left distal ureteral stone  Patient seen and evaluated. No new complaints. Exam unchanged. Will proceed with planned procedure as scheduled.

## 2024-05-09 NOTE — ASSESSMENT & PLAN NOTE
At higher risk for atelectasis/hypoxia.  I will order continuous pulse oximetry  Emphasized incentive spirometry

## 2024-05-09 NOTE — PROGRESS NOTES
"Hospital Medicine Daily Progress Note    Date of Service  5/9/2024    Chief Complaint  Daniel Gibbs is a 48 y.o. male admitted 5/8/2024 with flank pain.    Hospital Course  As per chart review:  \"48 y.o. male with a past medical history of nephrolithiasis, tobacco dependence, elevated BMI of 36 who presented 5/8/2024 with flank pain.  The patient reports that he has not been feeling well over the past 2-3 weeks.  He has been having multiple episodes of left-sided flank pain.\"    Interval Problem Update  5/9: Patient seen at bedside this morning.  Patient laying in bed, the patient denying dysuria at the time of my evaluation.  Will hold antibiotics for now.  The patient will undergo urological procedure.  We appreciate further recommendations by urology.  Continue to monitor closely.    I have discussed this patient's plan of care and discharge plan at IDT rounds today with Case Management, Nursing, Nursing leadership, and other members of the IDT team.    Consultants/Specialty  urology    Code Status  Full Code    Disposition  The patient is not medically cleared for discharge to home or a post-acute facility.          Review of Systems  Review of Systems   Constitutional:  Positive for weight loss. Negative for chills and fever.   HENT:  Negative for hearing loss and nosebleeds.    Eyes:  Negative for blurred vision and double vision.   Respiratory:  Negative for cough and shortness of breath.    Cardiovascular:  Negative for chest pain and palpitations.   Gastrointestinal:  Negative for abdominal pain, heartburn and vomiting.   Genitourinary:  Positive for flank pain. Negative for urgency.   Musculoskeletal:  Negative for back pain and falls.   Skin:  Negative for itching and rash.   Neurological:  Negative for dizziness and headaches.   Psychiatric/Behavioral:  Negative for substance abuse. The patient is not nervous/anxious.    All other systems reviewed and are negative.       Physical Exam  Temp:  [35.9 " °C (96.7 °F)-36.4 °C (97.6 °F)] 36.1 °C (97 °F)  Pulse:  [73-89] 77  Resp:  [16-18] 18  BP: ()/() 85/47  SpO2:  [87 %-97 %] 94 %    Physical Exam  Vitals and nursing note reviewed.   Constitutional:       Appearance: He is obese. He is ill-appearing.   HENT:      Head: Normocephalic and atraumatic.      Right Ear: External ear normal.      Left Ear: External ear normal.      Nose: Nose normal.      Mouth/Throat:      Mouth: Mucous membranes are moist.      Pharynx: Oropharynx is clear.   Eyes:      General:         Right eye: No discharge.         Left eye: No discharge.      Extraocular Movements: Extraocular movements intact.      Pupils: Pupils are equal, round, and reactive to light.   Cardiovascular:      Rate and Rhythm: Normal rate and regular rhythm.      Heart sounds: No murmur heard.  Pulmonary:      Effort: Pulmonary effort is normal. No respiratory distress.      Breath sounds: Normal breath sounds.   Abdominal:      General: Abdomen is flat. Bowel sounds are normal. There is no distension.      Palpations: Abdomen is soft.      Tenderness: There is no abdominal tenderness. There is left CVA tenderness.   Musculoskeletal:      Cervical back: Normal range of motion and neck supple.      Right lower leg: No edema.      Left lower leg: No edema.   Skin:     General: Skin is warm.   Neurological:      General: No focal deficit present.      Mental Status: He is alert and oriented to person, place, and time.   Psychiatric:         Mood and Affect: Mood normal.         Behavior: Behavior normal.         Fluids    Intake/Output Summary (Last 24 hours) at 5/9/2024 1058  Last data filed at 5/9/2024 0741  Gross per 24 hour   Intake 1250 ml   Output --   Net 1250 ml       Laboratory  Recent Labs     05/08/24  1547 05/09/24  0004   WBC 14.5* 9.6   RBC 7.41* 6.51*   HEMOGLOBIN 19.7* 17.3   HEMATOCRIT 58.5* 52.2*   MCV 78.9* 80.2*   MCH 26.6* 26.6*   MCHC 33.7 33.1   RDW 36.5 36.5   PLATELETCT 117* 103*    MPV 9.6 9.7     Recent Labs     05/08/24  1547 05/09/24  0004   SODIUM 136 137   POTASSIUM 4.4 4.0   CHLORIDE 98 99   CO2 27 30   GLUCOSE 134* 177*   BUN 15 15   CREATININE 1.26 1.37   CALCIUM 9.1 8.4                   Imaging  CT-RENAL COLIC EVALUATION(A/P W/O)   Final Result         1. Bilateral nephrolithiasis.   2. There is a 5 mm stone in the distal left ureter with mild left-sided hydroureteronephrosis.   3. Low-attenuation renal lesions may represent cysts. This could be confirmed with ultrasound.   4. There is no evidence for bowel obstruction, diverticulitis, or appendicitis.      [id number] VIEW    (Results Pending)   DX-PORTABLE FLUOROSCOPY < 1 HOUR    (Results Pending)        Assessment/Plan  * Ureteric stone- (present on admission)  Assessment & Plan  CT imaging showed evidence for 5 mm stone in the left ureter with hydronephrosis  Urology, Dr Saleh, consulted to consider lithotripsy  N.p.o. after midnight.  I will start intravenous fluids, tamsulosin and multimodal pain control    5/9: Patient to undergo urological procedure today, we appreciate further recommendations.  For now we will not start the patient on antibiotics.  The patient denying any dysuria, afebrile and normal white blood cell count.  Will monitor for any signs of infection for now.    Elevated LFTs  Assessment & Plan  Unclear etiology at this point.  The patient lying abdominal pain.  Will monitor closely, repeat CMP tomorrow.  If trending upwards will consider ultrasound of the right upper quadrant.    Tobacco abuse counseling  Assessment & Plan  I discussed with patient for at least 11 minutes his tobacco abuse.  The patient states that he smokes 1 pack a day.  We discussed lifestyle modifications.  He understands and he would like to quit.  He states that he has tried Chantix in the past.  We discussed other pharmacological options like bupropion.  He understands and he will follow-up with his PCP as an outpatient.  He says  that he does not want nicotine patch while hospitalized.    Thrombocytopenia (HCC)  Assessment & Plan  No signs of bleeding  monitor    Advance care planning  Assessment & Plan  I discussed with patient for at least 16 minutes further goals of care.  This included CODE STATUS which includes full code and DNR/DNI.  The patient would like to be full code.  We also discussed further treatment goals.  The patient like to have full treatment options.  This includes invasive or noninvasive procedures as needed.  In the event that the patient cannot make decisions for himself he would like his mother to make decisions for him.    Hyperglycemia  Assessment & Plan  Pending A1c  Monitor  We have also ordered a lipid panel for tomorrow.    Elevated blood pressure reading- (present on admission)  Assessment & Plan  No known history of primary hypertension  Likely secondary to severe pain  Multimodal pain control  I will start as needed labetalol for extreme hypertension  Consider starting scheduled antihypertensive medications according to blood pressure trend    5/9: Blood pressure seems to be better controlled today.  Will continue to monitor for now.    Leukocytosis- (present on admission)  Assessment & Plan  Likely reactive secondary to severe pain  No focal sign of infection at this point   Continue to montior for signs of infection      5/9: Resolved.    BMI 34.0-34.9,adult- (present on admission)  Assessment & Plan  At higher risk for atelectasis/hypoxia.  I will order continuous pulse oximetry  Emphasized incentive spirometry       Dehydration- (present on admission)  Assessment & Plan  Likely secondary to reduced oral intake   Encourage oral intake as tolerated, antiemetics as needed.  Intravenous hydration until adequate oral intake is achieved.          VTE prophylaxis: SCDs    I have performed a physical exam and reviewed and updated ROS and Plan today (5/9/2024). In review of yesterday's note (5/8/2024), there are  no changes except as documented above.      I spend at least 51 minutes providing care for this patient.  This include face-to-face interview, physical examination.  Review of lab work including CBC, CMP.  Review of CT scan.  Discussion with multidisciplinary team including case management, nursing staff and pharmacy.  Creating plan of care, reviewing orders.    Moreover, I discussed with patient for at least 16 minutes further goals of care.  This included CODE STATUS which includes full code and DNR/DNI.  The patient would like to be full code.  We also discussed further treatment goals.  The patient like to have full treatment options.  This includes invasive or noninvasive procedures as needed.  In the event that the patient cannot make decisions for himself he would like his mother to make decisions for him.    Moreover, I discussed with patient for at least 11 minutes his tobacco abuse. The patient states that he smokes 1 pack a day. We discussed lifestyle modifications. He understands and he would like to quit. He states that he has tried Chantix in the past. We discussed other pharmacological options like bupropion. He understands and he will follow-up with his PCP as an outpatient. He says that he does not want nicotine patch while hospitalized.

## 2024-05-09 NOTE — CARE PLAN
The patient is Stable - Low risk of patient condition declining or worsening    Shift Goals  Clinical Goals: remain free from falls, manage pain at or above 3/10 with mediation per MAR, remain NPO strict until lithotripsy later on today at 1000  Patient Goals: rest  Family Goals: Stay updated on POC    Progress made toward(s) clinical / shift goals:  pt remained free from falls, pain was managed with medication per MAR, pt remained NPO strict until lithotripsy    Problem: Pain - Standard  Goal: Alleviation of pain or a reduction in pain to the patient’s comfort goal    Outcome: Progressing     Problem: Knowledge Deficit - Standard  Goal: Patient and family/care givers will demonstrate understanding of plan of care, disease process/condition, diagnostic tests and medications  5/9/2024 1507 by Karen Kauffman R.N.  Outcome: Progressing  5/9/2024 1459 by Karen Kauffman R.N.  Outcome: Progressing     Problem: Discharge Barriers/Planning  Goal: Patient's continuum of care needs are met    Outcome: Progressing     Problem: Respiratory  Goal: Patient will achieve/maintain optimum respiratory ventilation and gas exchange    Outcome: Progressing     Problem: Urinary Elimination  Goal: Establish and maintain regular urinary output    Outcome: Progressing     Problem: Self Care  Goal: Patient will have the ability to perform ADLs independently or with assistance (bathe, groom, dress, toilet and feed)    Outcome: Progressing     Problem: Infection - Standard  Goal: Patient will remain free from infection    Outcome: Progressing       Patient is not progressing towards the following goals:

## 2024-05-09 NOTE — ASSESSMENT & PLAN NOTE
Unclear etiology at this point.  The patient lying abdominal pain.  Will monitor closely, repeat CMP tomorrow.  If trending upwards will consider ultrasound of the right upper quadrant.

## 2024-05-09 NOTE — ANESTHESIA POSTPROCEDURE EVALUATION
Patient: Daniel Gibbs    Procedure Summary       Date: 05/09/24 Room / Location:  OR  / SURGERY [facility]    Anesthesia Start: 1022 Anesthesia Stop: 1047    Procedures:       CYSTOSCOPY (Left)      LITHOTRIPSY, USING LASER (Left) Diagnosis: (obstructing left distal ureteral stone)    Surgeons: Mj Chacko M.D. Responsible Provider: Philippe Negrete M.D.    Anesthesia Type: general ASA Status: 2            Final Anesthesia Type: general  Last vitals  BP   Blood Pressure: (!) 141/86    Temp   36.1 °C (97 °F)    Pulse   73   Resp   18    SpO2   97 %      Anesthesia Post Evaluation    Patient location during evaluation: PACU  Patient participation: complete - patient participated  Level of consciousness: awake and alert    Airway patency: patent  Anesthetic complications: no  Cardiovascular status: hemodynamically stable  Respiratory status: acceptable  Hydration status: euvolemic    PONV: none          No notable events documented.     Nurse Pain Score: 0 (NPRS)

## 2024-05-09 NOTE — PROGRESS NOTES
Received report from ER RN, .    Pt arrived via wheelchair by transport personnel. Pt on room air, ambulated to bed from . Pt belongings brought into room by visitor. Admission Profile, including med rec and allergies completed.     Welcome Guide provided. Call light within reach and patient demonstrates understanding of its use. Pt is calm with unlabored breathing.     VS taken, stable. Pain and nausea assessed, no PRN ordered interventions needed.     Hourly rounding in progress.

## 2024-05-09 NOTE — PROGRESS NOTES
Received report from day shift nurse. Assumed pt care. Pt is A&Ox4, resting comfortably in bed. Complaints of pain 2/10 at this time, no needs for interventions at this time. No signs of SOB/respiratory distress. Went over plan of night with patient. Fall precautions in place. Bed at lowest position. Call light and personal belongings within reach. No further needs at this time.

## 2024-05-09 NOTE — H&P
Hospital Medicine History & Physical Note    Date of Service  5/8/2024    Primary Care Physician  Pcp Pt States None    Consultants  Urology, Dr Saleh    Code Status  Full Code    Chief Complaint  Chief Complaint   Patient presents with    Flank Pain     History of Presenting Illness  Daniel Gibbs is a 48 y.o. male with a past medical history of nephrolithiasis, tobacco dependence, elevated BMI of 36 who presented 5/8/2024 with flank pain.  The patient reports that he has not been feeling well over the past 2-3 weeks.  He has been having multiple episodes of left-sided flank pain, suprapubic pain and burning/painful urination.  He did have CT imaging that did not show 3 mm stone and was prescribed tamsulosin however pain continued to worsen.    I discussed the plan of care with emergency physician, the patient and patient family present bedside in the emergency room.    Review of Systems  Review of Systems   Constitutional:  Positive for malaise/fatigue. Negative for chills and fever.   Eyes:  Negative for discharge and redness.   Respiratory:  Negative for cough, shortness of breath and stridor.    Cardiovascular:  Negative for chest pain and leg swelling.   Gastrointestinal:  Negative for abdominal pain and vomiting.   Genitourinary:  Negative for flank pain.   Musculoskeletal:  Negative for myalgias.        Flank pain   Skin: Negative.    Neurological:  Negative for focal weakness.   Endo/Heme/Allergies:  Does not bruise/bleed easily.   Psychiatric/Behavioral:  The patient is not nervous/anxious.      Past Medical History   has a past medical history of Asthma.    Surgical History   has no past surgical history on file.     Family History  family history is not on file.      Social History   reports that he has been smoking. He does not have any smokeless tobacco history on file. He reports that he does not drink alcohol.    Allergies  No Known Allergies    Medications  Prior to Admission Medications    Prescriptions Last Dose Informant Patient Reported? Taking?   albuterol (VENTOLIN OR PROVENTIL) 108 (90 BASE) MCG/ACT AERS inhalation aerosol   No No   Sig: Inhale 2 Puffs by mouth every four hours as needed for Shortness of Breath.   azithromycin (ZITHROMAX) 250 MG TABS   No No   Si PO day #1 then 1 PO day #2-5.   promethazine-codeine (PHENERGAN-CODEINE) 6.25-10 MG/5ML SYRP   No No   Sig: Take 5 mL by mouth 4 times a day as needed for Cough.      Facility-Administered Medications: None     Physical Exam  Temp:  [36.4 °C (97.6 °F)] 36.4 °C (97.6 °F)  Pulse:  [75-86] 82  Resp:  [16-18] 16  BP: (149-165)/() 149/96  SpO2:  [93 %-97 %] 93 %  Blood Pressure: (!) 165/119   Temperature: 36.4 °C (97.6 °F)   Pulse: 86   Respiration: 18   Pulse Oximetry: 97 %     Physical Exam  Constitutional:       General: He is not in acute distress.     Appearance: He is not ill-appearing or diaphoretic.   HENT:      Head: Atraumatic.      Right Ear: External ear normal.      Left Ear: External ear normal.      Nose: No congestion or rhinorrhea.      Mouth/Throat:      Mouth: Mucous membranes are moist.   Eyes:      General: No scleral icterus.        Right eye: No discharge.         Left eye: No discharge.      Pupils: Pupils are equal, round, and reactive to light.   Cardiovascular:      Rate and Rhythm: Normal rate and regular rhythm.   Pulmonary:      Effort: Pulmonary effort is normal.   Abdominal:      General: There is no distension.      Tenderness: There is left CVA tenderness.   Musculoskeletal:      Cervical back: Neck supple. No rigidity. No muscular tenderness.      Right lower leg: No edema.      Left lower leg: No edema.   Skin:     Coloration: Skin is not jaundiced or pale.   Neurological:      Mental Status: He is alert and oriented to person, place, and time.      Coordination: Coordination normal.   Psychiatric:         Mood and Affect: Mood normal.         Behavior: Behavior normal.  "      Laboratory:  Recent Labs     05/08/24  1547   WBC 14.5*   RBC 7.41*   HEMOGLOBIN 19.7*   HEMATOCRIT 58.5*   MCV 78.9*   MCH 26.6*   MCHC 33.7   RDW 36.5   PLATELETCT 117*   MPV 9.6     Recent Labs     05/08/24  1547   SODIUM 136   POTASSIUM 4.4   CHLORIDE 98   CO2 27   GLUCOSE 134*   BUN 15   CREATININE 1.26   CALCIUM 9.1     Recent Labs     05/08/24  1547   GLUCOSE 134*         No results for input(s): \"NTPROBNP\" in the last 72 hours.      No results for input(s): \"TROPONINT\" in the last 72 hours.    Imaging:  CT-RENAL COLIC EVALUATION(A/P W/O)   Final Result         1. Bilateral nephrolithiasis.   2. There is a 5 mm stone in the distal left ureter with mild left-sided hydroureteronephrosis.   3. Low-attenuation renal lesions may represent cysts. This could be confirmed with ultrasound.   4. There is no evidence for bowel obstruction, diverticulitis, or appendicitis.        Assessment/Plan:  Justification for Admission Status  I anticipate this patient will require at least two midnights for appropriate medical management, necessitating inpatient admission because patient has ureteric stone with hydronephrosis.  Will require intravenous fluids, pain control, urology consultation and lithotripsy.    Patient will need a Med/Surg bed on MEDICAL service     * Ureteric stone- (present on admission)  Assessment & Plan  CT imaging showed evidence for 5 mm stone in the left ureter with hydronephrosis  Urology, Dr Saleh, consulted to consider lithotripsy  N.p.o. after midnight.  I will start intravenous fluids, tamsulosin and multimodal pain control    Elevated blood pressure reading- (present on admission)  Assessment & Plan  No known history of primary hypertension  Likely secondary to severe pain  Multimodal pain control  I will start as needed labetalol for extreme hypertension  Consider starting scheduled antihypertensive medications according to blood pressure trend    Leukocytosis- (present on " admission)  Assessment & Plan  Likely reactive secondary to severe pain  No focal sign of infection at this point   Continue to montior for signs of infection      BMI 34.0-34.9,adult- (present on admission)  Assessment & Plan  At higher risk for atelectasis/hypoxia.  I will order continuous pulse oximetry  Emphasized incentive spirometry       Dehydration- (present on admission)  Assessment & Plan  Likely secondary to reduced oral intake   Encourage oral intake as tolerated, antiemetics as needed.  Intravenous hydration until adequate oral intake is achieved.       VTE prophylaxis: SCDs/TEDs

## 2024-05-09 NOTE — CARE PLAN
The patient is Stable - Low risk of patient condition declining or worsening    Shift Goals  Clinical Goals: Pts pain will be tolerable throughout shift post intervention  Patient Goals: Rest comfortably    Progress made toward(s) clinical / shift goals:  Pts pain was controlled to tolerable level with PRN pain medication throughout shift.     Patient is not progressing towards the following goals:

## 2024-05-09 NOTE — PROGRESS NOTES
Assessed patient at bedside for chest pain, ICU RN and Med/Tele RN present with Primary RN. Patient states he's had similar pain in the past related to heartburn and acid reflux. EKG ordered and reviewed by Dr. Velasquez. No signs of ACS or ischemia. GI cocktail ordered and administered to the patient. Morning labs drawn along with Troponin.

## 2024-05-09 NOTE — OP REPORT
Urologic Surgery Operative Report     Pre-op Diagnosis: Left ureterolithiasis   Post-op Diagnosis: Same as above   Procedure: Left ureteroscopy, laser lithotripsy, stone basketing, left ureteral stent placement   Surgeon: Surgeon(s) and Role:     * Mj Chacko M.D. - Primary   Assistant: Circulator: Zulema Hickman R.N.  Laser Staff: Essie Woods  Scrub Person: Joaquina Bojorquez  Radiology Technologist: Crescencio Redding   Anesthesia: * No anesthesia type entered *,   Anesthesiologist: Philippe Negrete M.D.   Estimated Blood Loss: * No blood loss amount entered *   IV fluids <1L Crystalloid    Specimens: 1. Stone for chemical analysis    Complications: None   Condition: Stable, procedure well tolerated    Drains: 1. 0Tc12tn, JJ stent((on strings) )  2. No jay   Disposition:  1. PACU, discharge after voiding  2. f/u 5days for stent removal, will establish   Findings 1. 0.5 cm stone at Left distal ureter      Indication:   This is a 48-year-old male with long history nephrolithiasis no prior treatment here with obstructing left ureteral stone desiring definitive therapy..  After a full discussion of alternatives, risks, and benefits the patient consented to proceeding with Ureteroscopy, laser lithotripsy and possible JJ stent placement on the respective side.  He understands the risk of inability to access ureter, the need for second procedures, the possibility of negative ureteroscopy, that he may have stent discomfort until this is removed, bleeding, infection, ureteral injury or stricture, bladder injury, post op urinary retention requiring jay catheter, and the general pulmonary and cardiovascular risks associated with anesthesia.     Procedure Details:   The patient was taken to operating room and placed on table in supine position.  Ancef was administered prior to the start of the procedure based on previous urine cultures. Sequential compression devices were placed for deep venous thrombosis prophylaxis.  After induction of general anesthesia, both legs were placed in Elvin stirrups in the standard lithotomy position.  A timeout was held confirming the correct patient, procedure and laterality.   The perineal area was prepped and draped in a sterile fashion. A rigid cystoscope was inserted into the urethra and the bladder was emptied and then distended with sterile saline. Urethral sounds were not needed to dilate the urethral meatus. Cystoscopy revealed normal bladder mucosa, orthotopic ureteral orifices, and no other abnormalities.    We passed a wire through the ureteral orifice of the respective side into the renal pelvis which was visualized under fluoroscopic vision.     The wire was moved to the side and a semirigid ureteroscope was placed into the urethra and passed up the left ureter. We did not need a ureteral dilator to pass the scope into the ureter.  Stone was fractured in a small fragments with a 200 µm holmium laser fiber and all fragments removed with 0 tip nitinol basket save submillimeter fragments too small to basket.  Significant distal ureteral edema noted at the site of obstruction so we opted to place ureteral stent.    Returnign to rigid cystoscope, we then placed a left-sided JJ stent, [id number], JJ stent (on strings)  under fluoroscopy. We then saw that the JJ stent was in appropriate position, with a good curl visualized in the renal pelvis fluoroscopically and a good curl in the bladder endoscopically.  Stone sent for analysis.  The cystoscope was removed after emptying the bladder.  The patient was awoken from anesthesia and brought to recovery in satisfactory condition.     Will plan for stent removal in 4 to 5 days, patient from [place] will establish care with urology locally and we will be available for any questions in the interim.  Okay for discharge once stable per primary.       Mj Chacko M.D.   [address]  [place] [id number]   [phone]

## 2024-05-09 NOTE — PROGRESS NOTES
"2340 Pt complained of chest pain. Said it started when he got morphine in the ER and hasn't gone away since. Pt states \"it feels like an elephant sitting on my chest\". New set of vitals taken, charge made aware, Dr. Velasquez made aware. Pt assessed at bedside by primary RN, ICU RN, MED/Tele RN and charge RN.  EKG, Troponin and GI cocktail ordered.     2355 EKG complete, Dr. Velasquez made aware.   0002 labs drawn.   "

## 2024-05-09 NOTE — PROGRESS NOTES
0700 Received report from Night shift nurse  0839 Performed assessment  Pt is A&Ox4, complains of 1/10 pain; declines intervention. Updated on POC: lithotripsy today.  Call light within reach, hourly rounding in place, bed in lowest position.    0841: Received phone call from pt's mother asking about the time of pt's surgery today. I let her know that I don't see him on the schedule today, however that may change and I told her that I would call her when I knew. Per pt okay to talk to mother.     0859: Called pt's mother and let her know that he is going to surgery at 1000 this morning.    0905: April from Pre-op called for report.    1135: received call from [facility] (recovery), L stent placed, on his way up    1224: pt reassessed post L ureteral stent placement.     1230:Called [facility] to schedule stent removal for Monday the 13th as pt is leaving for [place] Tuesday. [facility] will give me a call back either today or tomorrow.

## 2024-05-09 NOTE — ANESTHESIA PROCEDURE NOTES
Airway    Date/Time: 5/9/2024 10:23 AM    Performed by: Philippe Negrete M.D.  Authorized by: Philippe Negrete M.D.    Location:  OR  Urgency:  Elective  Indications for Airway Management:  Anesthesia      Spontaneous Ventilation: absent    Sedation Level:  Deep  Preoxygenated: Yes    Final Airway Type:  Supraglottic airway  Final Supraglottic Airway:  Standard LMA    SGA Size:  4  Number of Attempts at Approach:  1

## 2024-05-10 LAB
ALBUMIN SERPL BCP-MCNC: 3.4 G/DL (ref 3.2–4.9)
ALBUMIN/GLOB SERPL: 1.3 G/DL
ALP SERPL-CCNC: 108 U/L (ref 30–99)
ALT SERPL-CCNC: 168 U/L (ref 2–50)
ANION GAP SERPL CALC-SCNC: 11 MMOL/L (ref 7–16)
AST SERPL-CCNC: 95 U/L (ref 12–45)
BILIRUB SERPL-MCNC: 0.2 MG/DL (ref 0.1–1.5)
BUN SERPL-MCNC: 13 MG/DL (ref 8–22)
CALCIUM ALBUM COR SERPL-MCNC: 8.9 MG/DL (ref 8.5–10.5)
CALCIUM SERPL-MCNC: 8.4 MG/DL (ref 8.4–10.2)
CHLORIDE SERPL-SCNC: 102 MMOL/L (ref 96–112)
CHOLEST SERPL-MCNC: 170 MG/DL (ref 100–199)
CO2 SERPL-SCNC: 25 MMOL/L (ref 20–33)
CREAT SERPL-MCNC: 1.09 MG/DL (ref 0.5–1.4)
ERYTHROCYTE [DISTWIDTH] IN BLOOD BY AUTOMATED COUNT: 39.1 FL (ref 35.9–50)
GFR SERPLBLD CREATININE-BSD FMLA CKD-EPI: 83 ML/MIN/1.73 M 2
GLOBULIN SER CALC-MCNC: 2.7 G/DL (ref 1.9–3.5)
GLUCOSE SERPL-MCNC: 95 MG/DL (ref 65–99)
HCT VFR BLD AUTO: 53.6 % (ref 42–52)
HDLC SERPL-MCNC: 42 MG/DL
HGB BLD-MCNC: 17.2 G/DL (ref 14–18)
LDLC SERPL CALC-MCNC: 109 MG/DL
MCH RBC QN AUTO: 26.6 PG (ref 27–33)
MCHC RBC AUTO-ENTMCNC: 32.1 G/DL (ref 32.3–36.5)
MCV RBC AUTO: 83 FL (ref 81.4–97.8)
PLATELET # BLD AUTO: 107 K/UL (ref 164–446)
PMV BLD AUTO: 10.5 FL (ref 9–12.9)
POTASSIUM SERPL-SCNC: 4.1 MMOL/L (ref 3.6–5.5)
PROT SERPL-MCNC: 6.1 G/DL (ref 6–8.2)
RBC # BLD AUTO: 6.46 M/UL (ref 4.7–6.1)
SODIUM SERPL-SCNC: 138 MMOL/L (ref 135–145)
TRIGL SERPL-MCNC: 94 MG/DL (ref 0–149)
WBC # BLD AUTO: 11 K/UL (ref 4.8–10.8)

## 2024-05-10 PROCEDURE — 99233 SBSQ HOSP IP/OBS HIGH 50: CPT | Performed by: INTERNAL MEDICINE

## 2024-05-10 RX ORDER — LISINOPRIL 5 MG/1
10 TABLET ORAL
Status: DISCONTINUED | OUTPATIENT
Start: 2024-05-10 | End: 2024-05-11 | Stop reason: HOSPADM

## 2024-05-10 RX ORDER — HEPARIN SODIUM 5000 [USP'U]/ML
5000 INJECTION, SOLUTION INTRAVENOUS; SUBCUTANEOUS EVERY 8 HOURS
Status: DISCONTINUED | OUTPATIENT
Start: 2024-05-10 | End: 2024-05-11 | Stop reason: HOSPADM

## 2024-05-10 RX ORDER — ATORVASTATIN CALCIUM 40 MG/1
40 TABLET, FILM COATED ORAL EVERY EVENING
Status: DISCONTINUED | OUTPATIENT
Start: 2024-05-10 | End: 2024-05-11 | Stop reason: HOSPADM

## 2024-05-10 RX ORDER — ATORVASTATIN CALCIUM 20 MG/1
20 TABLET, FILM COATED ORAL EVERY EVENING
Status: DISCONTINUED | OUTPATIENT
Start: 2024-05-10 | End: 2024-05-10

## 2024-05-10 RX ORDER — OXYBUTYNIN CHLORIDE 5 MG/1
5 TABLET ORAL 3 TIMES DAILY PRN
Status: DISCONTINUED | OUTPATIENT
Start: 2024-05-10 | End: 2024-05-11 | Stop reason: HOSPADM

## 2024-05-10 RX ADMIN — LISINOPRIL 10 MG: 5 TABLET ORAL at 13:56

## 2024-05-10 RX ADMIN — OXYCODONE HYDROCHLORIDE 5 MG: 5 TABLET ORAL at 00:09

## 2024-05-10 RX ADMIN — ATORVASTATIN CALCIUM 40 MG: 40 TABLET, FILM COATED ORAL at 17:41

## 2024-05-10 RX ADMIN — SODIUM CHLORIDE: 9 INJECTION, SOLUTION INTRAVENOUS at 02:37

## 2024-05-10 RX ADMIN — SENNOSIDES AND DOCUSATE SODIUM 2 TABLET: 50; 8.6 TABLET ORAL at 04:52

## 2024-05-10 RX ADMIN — TAMSULOSIN HYDROCHLORIDE 0.4 MG: 0.4 CAPSULE ORAL at 08:25

## 2024-05-10 ASSESSMENT — ENCOUNTER SYMPTOMS
NAUSEA: 0
BLURRED VISION: 0
HEARTBURN: 0
NERVOUS/ANXIOUS: 0
FEVER: 0
SHORTNESS OF BREATH: 0
BACK PAIN: 0
VOMITING: 0
DOUBLE VISION: 0
DIZZINESS: 0
WEIGHT LOSS: 1
HEADACHES: 0
PALPITATIONS: 0
FALLS: 0
CHILLS: 0
ABDOMINAL PAIN: 0
COUGH: 0
FLANK PAIN: 1

## 2024-05-10 ASSESSMENT — LIFESTYLE VARIABLES: SUBSTANCE_ABUSE: 0

## 2024-05-10 ASSESSMENT — PAIN DESCRIPTION - PAIN TYPE
TYPE: ACUTE PAIN;SURGICAL PAIN
TYPE: SURGICAL PAIN;ACUTE PAIN
TYPE: ACUTE PAIN

## 2024-05-10 NOTE — PROGRESS NOTES
Received bedside report from night shift RN.  Assumed pt care.   Assessment and chart check complete.  Pt is AA0X4, resting in bed comfortably, respirations even and unlabored, no signs of distress, denies nausea/vomiting.  Updated for the POC of the day.  Fall precautions in place, treaded socks on pt, bed in low position.   Call light within reach.   Educated pt to call if needing anything.   Hourly rounding.      1501-Pt is complaining of tooth and gums pain, offered tylenol and oxycodone but refusing it. Requesting for amoxicillin. MD notified, no new order.

## 2024-05-10 NOTE — PROGRESS NOTES
Note to reader: this note follows the APSO format rather than the historical SOAP format. Assessment and plan located at the top of the note for ease of use.    Chief Complaint  48 y.o. year old male here with Flank Pain      Assessment/Plan  Interval History   Active Hospital Problems    Diagnosis     Hyperglycemia [R73.9]     Advance care planning [Z71.89]     Thrombocytopenia (HCC) [D69.6]     Elevated LFTs [R79.89]     Tobacco abuse counseling [Z71.6]     Ureteric stone [N20.1]     Dehydration [E86.0]     BMI 34.0-34.9,adult [Z68.34]     Leukocytosis [D72.829]     Elevated blood pressure reading [R03.0]       pt seen and examined     5/10 - having some frequency and flank pain with stent in place. Cr 1.09. WBC 11. Afebrile overnight.     Disposition  Stable      PLAN:   Stent on string to be removed in our office on Mon, we will arrange.   Labs and vitals stable. No further surgical interventions warranted during this hospital stay.   Patient will establish with local urologist in [facility].   Signing off.     Review of Systems  Physical Exam   Review of Systems   Constitutional:  Negative for chills and fever.   Gastrointestinal:  Negative for abdominal pain, nausea and vomiting.   Genitourinary:  Positive for dysuria (mild), flank pain and frequency. Negative for hematuria and urgency.   All other systems reviewed and are negative.    Vitals:    05/09/24 2046 05/09/24 2049 05/10/24 0009 05/10/24 0416   BP: (!) 163/96 (!) 151/94 (!) 152/101 (!) 141/87   Pulse: 70  72 85   Resp: 16  16 16   Temp: 36.3 °C (97.3 °F)  36.4 °C (97.5 °F) 35.9 °C (96.7 °F)   TempSrc: Oral  Temporal Temporal   SpO2: 98%  93% 93%   Weight:       Height:         Physical Exam  Vitals and nursing note reviewed.   Constitutional:       Appearance: Normal appearance.   HENT:      Head: Normocephalic and atraumatic.      Mouth/Throat:      Mouth: Mucous membranes are moist.   Eyes:      Pupils: Pupils are equal, round, and reactive to light.    Pulmonary:      Effort: Pulmonary effort is normal.   Neurological:      Mental Status: He is alert and oriented to person, place, and time.   Psychiatric:         Mood and Affect: Mood normal.         Behavior: Behavior normal.          Hematology Chemistry   Lab Results   Component Value Date/Time    WBC 11.0 (H) 05/10/2024 01:31 AM    HEMOGLOBIN 17.2 05/10/2024 01:31 AM    HEMATOCRIT 53.6 (H) 05/10/2024 01:31 AM    PLATELETCT 107 (L) 05/10/2024 01:31 AM     Lab Results   Component Value Date/Time    SODIUM 138 05/10/2024 01:31 AM    POTASSIUM 4.1 05/10/2024 01:31 AM    CHLORIDE 102 05/10/2024 01:31 AM    CO2 25 05/10/2024 01:31 AM    GLUCOSE 95 05/10/2024 01:31 AM    BUN 13 05/10/2024 01:31 AM    CREATININE 1.09 05/10/2024 01:31 AM         Labs not explicitly included in this progress note were reviewed by the author.   Radiology/imaging not explicitly included in this progress note was reviewed by the author.     Core Measures

## 2024-05-10 NOTE — CARE PLAN
The patient is Stable - Low risk of patient condition declining or worsening    Shift Goals  Clinical Goals: Pts pain will be controlled less than 4/10 post intervention  Patient Goals: pain control, rest comfortably      Progress made toward(s) clinical / shift goals:  Pts pain was controlled to less than 4/10 post intervention of medicating per MAR throughout shift.     Patient is not progressing towards the following goals:

## 2024-05-10 NOTE — PROGRESS NOTES
Received report from day shift nurse. Assumed pt care. Pt is A&Ox4, resting comfortably in bed. No signs of SOB/respiratory distress. Went over plan of night with patient. Pt denies pain at this time. Fall precautions in place. Bed at lowest position. Call light and personal belongings within reach. No further needs at this time.

## 2024-05-10 NOTE — CARE PLAN
The patient is Stable - Low risk of patient condition declining or worsening    Shift Goals  Clinical Goals: Pain will be manage at tolerable level throughout the shift, monitor for urine output  Patient Goals: pain control, for discharge  Family Goals: Stay updated on POC    Progress made toward(s) clinical / shift goals:  Adequate urine output. Denies any abdominal pain at this moment. Pt able to ambulate to the bathroom by self.    Patient is not progressing towards the following goals:      Problem: Pain - Standard  Goal: Alleviation of pain or a reduction in pain to the patient’s comfort goal  Outcome: Progressing     Problem: Psychosocial  Goal: Patient's level of anxiety will decrease  Outcome: Progressing     Problem: Urinary Elimination  Goal: Establish and maintain regular urinary output  Outcome: Progressing

## 2024-05-10 NOTE — PROGRESS NOTES
"Hospital Medicine Daily Progress Note    Date of Service  5/10/2024    Chief Complaint  Daniel Gibbs is a 48 y.o. male admitted 5/8/2024 with flank pain.    Hospital Course  As per chart review:  \"48 y.o. male with a past medical history of nephrolithiasis, tobacco dependence, elevated BMI of 36 who presented 5/8/2024 with flank pain.  The patient reports that he has not been feeling well over the past 2-3 weeks.  He has been having multiple episodes of left-sided flank pain.\"    Interval Problem Update  5/9: Patient seen at bedside this morning.  Patient laying in bed, the patient denying dysuria at the time of my evaluation.  Will hold antibiotics for now.  The patient will undergo urological procedure.  We appreciate further recommendations by urology.  Continue to monitor closely.    5/10: Patient underwent left laparoscopy, laser lithotripsy, stone basketing and left ureteral stent placement yesterday.  The patient still complaining of some flank pain.  We have added oxybutynin, continue with tamsulosin.  We appreciate further recommendations by urology.  For now we will not start antibiotics.  Will repeat CMP tomorrow.  Continue to monitor closely.    I have discussed this patient's plan of care and discharge plan at IDT rounds today with Case Management, Nursing, Nursing leadership, and other members of the IDT team.    Consultants/Specialty  urology    Code Status  Full Code    Disposition  The patient is not medically cleared for discharge to home or a post-acute facility.          Review of Systems  Review of Systems   Constitutional:  Positive for weight loss. Negative for chills and fever.   HENT:  Negative for hearing loss and nosebleeds.    Eyes:  Negative for blurred vision and double vision.   Respiratory:  Negative for cough and shortness of breath.    Cardiovascular:  Negative for chest pain and palpitations.   Gastrointestinal:  Negative for abdominal pain, heartburn and vomiting. "   Genitourinary:  Positive for flank pain. Negative for urgency.   Musculoskeletal:  Negative for back pain and falls.   Skin:  Negative for itching and rash.   Neurological:  Negative for dizziness and headaches.   Psychiatric/Behavioral:  Negative for substance abuse. The patient is not nervous/anxious.    All other systems reviewed and are negative.       Physical Exam  Temp:  [35.9 °C (96.7 °F)-36.9 °C (98.5 °F)] 36.3 °C (97.4 °F)  Pulse:  [69-85] 78  Resp:  [16] 16  BP: (125-169)/() 150/107  SpO2:  [92 %-98 %] 95 %    Physical Exam  Vitals and nursing note reviewed.   Constitutional:       Appearance: He is obese. He is ill-appearing.   HENT:      Head: Normocephalic and atraumatic.      Right Ear: External ear normal.      Left Ear: External ear normal.      Nose: Nose normal.      Mouth/Throat:      Mouth: Mucous membranes are moist.      Pharynx: Oropharynx is clear.   Eyes:      General:         Right eye: No discharge.         Left eye: No discharge.      Extraocular Movements: Extraocular movements intact.      Pupils: Pupils are equal, round, and reactive to light.   Cardiovascular:      Rate and Rhythm: Normal rate and regular rhythm.      Heart sounds: No murmur heard.  Pulmonary:      Effort: Pulmonary effort is normal. No respiratory distress.      Breath sounds: Normal breath sounds.   Abdominal:      General: Abdomen is flat. Bowel sounds are normal. There is no distension.      Palpations: Abdomen is soft.      Tenderness: There is no abdominal tenderness. There is left CVA tenderness.   Musculoskeletal:      Cervical back: Normal range of motion and neck supple.      Right lower leg: No edema.      Left lower leg: No edema.   Skin:     General: Skin is warm.   Neurological:      General: No focal deficit present.      Mental Status: He is alert and oriented to person, place, and time.   Psychiatric:         Mood and Affect: Mood normal.         Behavior: Behavior normal.          Fluids    Intake/Output Summary (Last 24 hours) at 5/10/2024 1305  Last data filed at 5/10/2024 0903  Gross per 24 hour   Intake 1955.87 ml   Output 3050 ml   Net -1094.13 ml       Laboratory  Recent Labs     05/08/24  1547 05/09/24  0004 05/10/24  0131   WBC 14.5* 9.6 11.0*   RBC 7.41* 6.51* 6.46*   HEMOGLOBIN 19.7* 17.3 17.2   HEMATOCRIT 58.5* 52.2* 53.6*   MCV 78.9* 80.2* 83.0   MCH 26.6* 26.6* 26.6*   MCHC 33.7 33.1 32.1*   RDW 36.5 36.5 39.1   PLATELETCT 117* 103* 107*   MPV 9.6 9.7 10.5     Recent Labs     05/08/24 1547 05/09/24  0004 05/10/24  0131   SODIUM 136 137 138   POTASSIUM 4.4 4.0 4.1   CHLORIDE 98 99 102   CO2 27 30 25   GLUCOSE 134* 177* 95   BUN 15 15 13   CREATININE 1.26 1.37 1.09   CALCIUM 9.1 8.4 8.4             Recent Labs     05/10/24  0131   TRIGLYCERIDE 94   HDL 42   *       Imaging  [id number] VIEW   Final Result      Digitized intraoperative radiograph is submitted for review. This examination is not for diagnostic purpose but for guidance during a surgical procedure. Please see the patient's chart for full procedural details.         INTERPRETING LOCATION: [address] [place] [address], [id number]      DX-PORTABLE FLUOROSCOPY < 1 HOUR   Final Result      Portable fluoroscopy utilized for 0.1 minute.         INTERPRETING LOCATION: [address] [place] [address], [id number]      CT-RENAL COLIC EVALUATION(A/P W/O)   Final Result         1. Bilateral nephrolithiasis.   2. There is a 5 mm stone in the distal left ureter with mild left-sided hydroureteronephrosis.   3. Low-attenuation renal lesions may represent cysts. This could be confirmed with ultrasound.   4. There is no evidence for bowel obstruction, diverticulitis, or appendicitis.           Assessment/Plan  * Ureteric stone- (present on admission)  Assessment & Plan  CT imaging showed evidence for 5 mm stone in the left ureter with hydronephrosis  Urology, Dr Saleh, consulted to consider lithotripsy  N.p.o. after midnight.  I will start  intravenous fluids, tamsulosin and multimodal pain control    5/9: Patient to undergo urological procedure today, we appreciate further recommendations.  For now we will not start the patient on antibiotics.  The patient denying any dysuria, afebrile and normal white blood cell count.  Will monitor for any signs of infection for now.    Elevated LFTs  Assessment & Plan  Unclear etiology at this point.  The patient lying abdominal pain.  Will monitor closely, repeat CMP tomorrow.  If trending upwards will consider ultrasound of the right upper quadrant.    Tobacco abuse counseling  Assessment & Plan  5/9: I discussed with patient for at least 11 minutes his tobacco abuse.  The patient states that he smokes 1 pack a day.  We discussed lifestyle modifications.  He understands and he would like to quit.  He states that he has tried Chantix in the past.  We discussed other pharmacological options like bupropion.  He understands and he will follow-up with his PCP as an outpatient.  He says that he does not want nicotine patch while hospitalized.    Thrombocytopenia (HCC)  Assessment & Plan  No signs of bleeding  monitor    Advance care planning  Assessment & Plan  5/9: I discussed with patient for at least 16 minutes further goals of care.  This included CODE STATUS which includes full code and DNR/DNI.  The patient would like to be full code.  We also discussed further treatment goals.  The patient like to have full treatment options.  This includes invasive or noninvasive procedures as needed.  In the event that the patient cannot make decisions for himself he would like his mother to make decisions for him.    Hyperglycemia  Assessment & Plan  Pending A1c  Monitor  We have also ordered a lipid panel for tomorrow.    5/10: Patient had an A1c was reported 6.4.  Patient with prediabetes.  We talked about lifestyle modifications, however the patient stated that he would like to start metformin.  Due to the patient's A1c  being 6.4, I think this would be a good idea to start upon discharge.  Will also start the patient on atorvastatin.    Elevated blood pressure reading- (present on admission)  Assessment & Plan  No known history of primary hypertension  Likely secondary to severe pain  Multimodal pain control  I will start as needed labetalol for extreme hypertension  Consider starting scheduled antihypertensive medications according to blood pressure trend      5/10: Due to the patient having an A1c of 6.4, we have started the patient on lisinopril 10 mg as the blood pressure seems to be consistently high.    Leukocytosis- (present on admission)  Assessment & Plan  Likely reactive secondary to severe pain  No focal sign of infection at this point   Continue to montior for signs of infection      5/10: I suspect this is reactive from recent procedure.  The patient denying burning, or dysuria he describes more as flank pain from the stent.  Will not start antibiotics at this time.    BMI 34.0-34.9,adult- (present on admission)  Assessment & Plan  At higher risk for atelectasis/hypoxia.  I will order continuous pulse oximetry  Emphasized incentive spirometry       Dehydration- (present on admission)  Assessment & Plan  Likely secondary to reduced oral intake   Encourage oral intake as tolerated, antiemetics as needed.  Intravenous hydration until adequate oral intake is achieved.          VTE prophylaxis: heparin    I have performed a physical exam and reviewed and updated ROS and Plan today (5/10/2024). In review of yesterday's note (5/9/2024), there are no changes except as documented above.      I spend at least 52 minutes providing care for this patient.  This include face-to-face interview, physical examination.  Review of lab work including CBC, CMP, A1c.  Review of operative note. Discussion with multidisciplinary team including case management, nursing staff and pharmacy.  Creating plan of care, reviewing orders.

## 2024-05-11 VITALS
DIASTOLIC BLOOD PRESSURE: 96 MMHG | OXYGEN SATURATION: 100 % | WEIGHT: 240.96 LBS | HEIGHT: 70 IN | TEMPERATURE: 96.9 F | HEART RATE: 71 BPM | BODY MASS INDEX: 34.5 KG/M2 | SYSTOLIC BLOOD PRESSURE: 137 MMHG | RESPIRATION RATE: 18 BRPM

## 2024-05-11 LAB
ALBUMIN SERPL BCP-MCNC: 3.4 G/DL (ref 3.2–4.9)
ALBUMIN/GLOB SERPL: 1 G/DL
ALP SERPL-CCNC: 110 U/L (ref 30–99)
ALT SERPL-CCNC: 126 U/L (ref 2–50)
ANION GAP SERPL CALC-SCNC: 11 MMOL/L (ref 7–16)
AST SERPL-CCNC: 51 U/L (ref 12–45)
BILIRUB SERPL-MCNC: 0.4 MG/DL (ref 0.1–1.5)
BUN SERPL-MCNC: 13 MG/DL (ref 8–22)
CALCIUM ALBUM COR SERPL-MCNC: 9.2 MG/DL (ref 8.5–10.5)
CALCIUM SERPL-MCNC: 8.7 MG/DL (ref 8.4–10.2)
CHLORIDE SERPL-SCNC: 101 MMOL/L (ref 96–112)
CO2 SERPL-SCNC: 24 MMOL/L (ref 20–33)
CREAT SERPL-MCNC: 0.91 MG/DL (ref 0.5–1.4)
ERYTHROCYTE [DISTWIDTH] IN BLOOD BY AUTOMATED COUNT: 36 FL (ref 35.9–50)
GFR SERPLBLD CREATININE-BSD FMLA CKD-EPI: 103 ML/MIN/1.73 M 2
GLOBULIN SER CALC-MCNC: 3.3 G/DL (ref 1.9–3.5)
GLUCOSE SERPL-MCNC: 110 MG/DL (ref 65–99)
HCT VFR BLD AUTO: 55.1 % (ref 42–52)
HGB BLD-MCNC: 18.4 G/DL (ref 14–18)
MCH RBC QN AUTO: 26.3 PG (ref 27–33)
MCHC RBC AUTO-ENTMCNC: 33.4 G/DL (ref 32.3–36.5)
MCV RBC AUTO: 78.7 FL (ref 81.4–97.8)
PLATELET # BLD AUTO: 132 K/UL (ref 164–446)
PMV BLD AUTO: 10.2 FL (ref 9–12.9)
POTASSIUM SERPL-SCNC: 4.2 MMOL/L (ref 3.6–5.5)
PROT SERPL-MCNC: 6.7 G/DL (ref 6–8.2)
RBC # BLD AUTO: 7 M/UL (ref 4.7–6.1)
SODIUM SERPL-SCNC: 136 MMOL/L (ref 135–145)
WBC # BLD AUTO: 10.8 K/UL (ref 4.8–10.8)

## 2024-05-11 PROCEDURE — 99239 HOSP IP/OBS DSCHRG MGMT >30: CPT | Performed by: INTERNAL MEDICINE

## 2024-05-11 RX ORDER — LISINOPRIL 10 MG/1
10 TABLET ORAL DAILY
Qty: 30 TABLET | Refills: 0 | Status: SHIPPED | OUTPATIENT
Start: 2024-05-12

## 2024-05-11 RX ORDER — ATORVASTATIN CALCIUM 40 MG/1
40 TABLET, FILM COATED ORAL EVERY EVENING
Qty: 30 TABLET | Refills: 0 | Status: SHIPPED | OUTPATIENT
Start: 2024-05-11

## 2024-05-11 RX ORDER — OXYBUTYNIN CHLORIDE 5 MG/1
5 TABLET ORAL 3 TIMES DAILY PRN
Qty: 90 TABLET | Refills: 0 | Status: SHIPPED | OUTPATIENT
Start: 2024-05-11

## 2024-05-11 RX ORDER — AMOXICILLIN 250 MG
2 CAPSULE ORAL 2 TIMES DAILY
Qty: 30 TABLET | Refills: 0 | Status: SHIPPED | OUTPATIENT
Start: 2024-05-11

## 2024-05-11 RX ADMIN — LISINOPRIL 10 MG: 5 TABLET ORAL at 04:55

## 2024-05-11 RX ADMIN — TAMSULOSIN HYDROCHLORIDE 0.4 MG: 0.4 CAPSULE ORAL at 07:47

## 2024-05-11 ASSESSMENT — FIBROSIS 4 INDEX: FIB4 SCORE: 1.69

## 2024-05-11 ASSESSMENT — PAIN DESCRIPTION - PAIN TYPE: TYPE: ACUTE PAIN;SURGICAL PAIN

## 2024-05-11 NOTE — PROGRESS NOTES
0700 Received report from Night shift nurse  0746 Performed assessment  Pt is A&Ox4, complains of 1/10 pain; declines intervention. Updated on POC: D/C today.  Call light within reach, hourly rounding in place, bed in lowest position.    906: called [phone] and LM to call and make follow up appts for pt, Monday: urology and PCP follow up in [facility]. Gave  and MRN    1011: called kitchen to order scrabbled eggs per pt request.

## 2024-05-11 NOTE — DISCHARGE SUMMARY
"Discharge Summary    CHIEF COMPLAINT ON ADMISSION  Chief Complaint   Patient presents with    Flank Pain       Reason for Admission  Abdominal Pain     Admission Date  5/8/2024    CODE STATUS  Full Code    HPI & HOSPITAL COURSE  As per chart review:  \"48 y.o. male with a past medical history of nephrolithiasis, tobacco dependence, elevated BMI of 36 who presented 5/8/2024 with flank pain.  The patient reports that he has not been feeling well over the past 2-3 weeks.  He has been having multiple episodes of left-sided flank pain.\"    Patient was admitted for further management and care.  The patient was found to have nephrolithiasis.  Urology was consulted and the patient underwent left ureteroscopy, laser lithotripsy, stone basketing and left ureteral stent placement.  It seems they were able to take the stone out, however they decided to leave a stent and due to the edema.  As per urology they will see the patient on Monday to remove the stent.  The patient is from [facility] where he will be flying back to [facility].    Of note the patient while hospitalized was found to have an A1c of 6.4.  Due to being in the cost of being diabetic, had a long discussion with the patient regarding lifestyle modifications, however the patient states that it is very hard for him to adhere to a strict diet.  We went back and forth regarding the benefits of metformin, he decided that he would like to try metformin.  Will discharge patient on metformin.  The patient was also found to have elevated blood pressures so we started the patient on lisinopril.  We also started the patient on atorvastatin.    While hospitalized the patient was complaining of some tooth pain, the patient is a smoker, however there were no other signs of infection.  The tooth pain went away.  We did not start antibiotics, however the patient will require close follow-up with his dentist.  He mentions that he will follow-up with his dentist.    The patient was also " found to have mild elevation of his liver function test that now seem to be improving.  This could be due to fatty liver.  The patient denying any abdominal pain.    The patient seen at bedside this morning.  Today the patient complaining of any pain.  The patient did not want to be discharged on opiate medication.  The patient will be discharged on tamsulosin and oxybutynin for spasms due to the stent.  I discussed with urology, they do not recommend antibiotics at this time.  The patient not complaining of dysuria, white blood cell count within normal limits today and the patient remained afebrile.    Therefore, he is discharged in fair and stable condition to home with close outpatient follow-up.    The patient met 2-midnight criteria for an inpatient stay at the time of discharge.    Discharge Date  05/11/2024    FOLLOW UP ITEMS POST DISCHARGE  The patient will require close follow-up with PCP and urology as an outpatient.    DISCHARGE DIAGNOSES  Principal Problem:    Ureteric stone (POA: Yes)  Active Problems:    Elevated LFTs (POA: Unknown)    Dehydration (POA: Yes)    BMI 34.0-34.9,adult (POA: Yes)    Leukocytosis (POA: Yes)    Elevated blood pressure reading (POA: Yes)    Hyperglycemia (POA: Unknown)    Advance care planning (POA: Unknown)    Thrombocytopenia (HCC) (POA: Unknown)    Tobacco abuse counseling (POA: Unknown)  Resolved Problems:    * No resolved hospital problems. *      FOLLOW UP    Primary Care Provider    Schedule an appointment as soon as possible for a visit      Mj Chacko M.D.  [address]  [place] [address]  [phone]    Schedule an appointment as soon as possible for a visit        MEDICATIONS ON DISCHARGE     Medication List        START taking these medications        Instructions   atorvastatin 40 MG Tabs  Commonly known as: Lipitor   Take 1 Tablet by mouth every evening.  Dose: 40 mg     lisinopril 10 MG Tabs  Start taking on: May 12, 2024  Commonly known as:  Prinivil   Take 1 Tablet by mouth every day.  Dose: 10 mg     * metFORMIN 500 MG Tabs  Commonly known as: Glucophage   Take 1 Tablet by mouth 2 times a day with meals.  Dose: 500 mg     * metFORMIN 500 MG Tabs  Commonly known as: Glucophage   Take 1 Tablet by mouth 2 times a day with meals.  Dose: 500 mg     oxybutynin 5 MG Tabs  Commonly known as: Ditropan   Take 1 Tablet by mouth 3 times a day as needed (spasm from stent).  Dose: 5 mg     senna-docusate 8.6-50 MG Tabs  Commonly known as: Pericolace Or Senokot S   Take 2 Tablets by mouth 2 times a day.  Dose: 2 Tablet           * This list has 2 medication(s) that are the same as other medications prescribed for you. Read the directions carefully, and ask your doctor or other care provider to review them with you.                CHANGE how you take these medications        Instructions   tamsulosin 0.4 MG capsule  What changed: when to take this  Commonly known as: Flomax   Take 1 Capsule by mouth every day.  Dose: 0.4 mg            CONTINUE taking these medications        Instructions   traMADol 50 MG Tabs  Commonly known as: Ultram   Take 50 mg by mouth every 6 hours as needed for Severe Pain.  Dose: 50 mg              Allergies  No Known Allergies    DIET  Orders Placed This Encounter   Procedures    Diet Order Diet: Consistent CHO (Diabetic)     Standing Status:   Standing     Number of Occurrences:   1     Order Specific Question:   Diet:     Answer:   Consistent CHO (Diabetic) [4]       ACTIVITY  As tolerated.  Weight bearing as tolerated    CONSULTATIONS  Urology    PROCEDURES  Left ureteroscopy, laser lithotripsy, stone basketing, left ureteral stent placement       [id number] VIEW   Final Result      Digitized intraoperative radiograph is submitted for review. This examination is not for diagnostic purpose but for guidance during a surgical procedure. Please see the patient's chart for full procedural details.         INTERPRETING LOCATION: [address] [place] [address]  [place], [address]      DX-PORTABLE FLUOROSCOPY < 1 HOUR   Final Result      Portable fluoroscopy utilized for 0.1 minute.         INTERPRETING LOCATION: [address] [facility] [place], [place], [place], [address]      CT-RENAL COLIC EVALUATION(A/P W/O)   Final Result         1. Bilateral nephrolithiasis.   2. There is a 5 mm stone in the distal left ureter with mild left-sided hydroureteronephrosis.   3. Low-attenuation renal lesions may represent cysts. This could be confirmed with ultrasound.   4. There is no evidence for bowel obstruction, diverticulitis, or appendicitis.           LABORATORY  Lab Results   Component Value Date    SODIUM 136 05/11/2024    POTASSIUM 4.2 05/11/2024    CHLORIDE 101 05/11/2024    CO2 24 05/11/2024    GLUCOSE 110 (H) 05/11/2024    BUN 13 05/11/2024    CREATININE 0.91 05/11/2024        Lab Results   Component Value Date    WBC 10.8 05/11/2024    HEMOGLOBIN 18.4 (H) 05/11/2024    HEMATOCRIT 55.1 (H) 05/11/2024    PLATELETCT 132 (L) 05/11/2024        Total time of the discharge process exceeds 31 minutes.

## 2024-05-11 NOTE — PROGRESS NOTES
Received report from day shift nurse. Assumed pt care at 1915. Pt is A&Ox4, resting comfortably in bed. Pt on r.a.. No signs of SOB/respiratory distress. Labs noted, VSS. Pt c/o no pain at this moment. Fall precautions in place. Bed at lowest position. Call light and personal belongings within reach. Plan of care on going, no further concerns as of present.

## 2024-05-11 NOTE — PROGRESS NOTES
Pt discharged to home. Discharge instructions provided to pt. Pt verbalizes understanding. Pt states all questions have been answered. Signed copy in chart. Prescriptions sent to [facility]. Pt states that all personal belongings are in possession. Pt off unit via walking (self) walker was offered and pt declined, escorted by family.

## 2024-05-11 NOTE — CARE PLAN
The patient is Stable - Low risk of patient condition declining or worsening    Shift Goals  Clinical Goals: monitor pain and urine output  Patient Goals: sleep at least 8 hours  Family Goals: Stay updated on POC    Progress made toward(s) clinical / shift goals:  Pain level and urine output kept monitored. Call light within reach. Fall precautions in placed. Hourly rounding and plan of care in progress.    Patient is not progressing towards the following goals:N/A

## 2024-05-13 ENCOUNTER — TELEPHONE (OUTPATIENT)
Dept: HEALTH INFORMATION MANAGEMENT | Facility: OTHER | Age: 49
End: 2024-05-13
Payer: COMMERCIAL

## 2024-05-13 ENCOUNTER — PATIENT OUTREACH (OUTPATIENT)
Dept: SCHEDULING | Facility: IMAGING CENTER | Age: 49
End: 2024-05-13
Payer: COMMERCIAL

## 2024-05-14 LAB
APPEARANCE STONE: NORMAL
COMPN STONE: NORMAL
SPECIMEN WT: 8 MG

## 2024-05-21 ENCOUNTER — TELEPHONE (OUTPATIENT)
Dept: HEALTH INFORMATION MANAGEMENT | Facility: OTHER | Age: 49
End: 2024-05-21
Payer: COMMERCIAL

## 2025-06-12 NOTE — CONSULTS
"[facility] [place] Consult/H&P Note    Patient's Name/MRN: Daniel Gibbs, [id number]   Room #: 2204/01    Admit Date:5/8/2024  Today's Date: 5/9/2024   Length of stay:  LOS: 1 day      Reason for consult/chief complaint: left flank pain, obstructing stone  ID/HPI: Daniel Gibbs is a 48 y.o. male patient who p/w severe 9/10 flank pain on left side. He has had -N, -V, -F, -C.  This is not his episode of stones. Has not had prior stone procedures in past.  In ER, WBC was 9.6, UA was not c/w infection, and Cr was near baseline at 1.37.  CT was done showing 5mm stone in the distal left ureter with assoc hydro.      Past Medical History:   Past Medical History:   Diagnosis Date    Asthma     childhood        Past Surgical History:   History reviewed. No pertinent surgical history.     Family History:   History reviewed. No pertinent family history.      Social History:   Social History     Tobacco Use    Smoking status: Every Day     Current packs/day: 0.50     Types: Cigarettes   Substance Use Topics    Alcohol use: No      Social History     Social History Narrative    Not on file        Allergies: he Patient has no known allergies.    Medications:   Medications Prior to Admission   Medication Sig Dispense Refill Last Dose    tamsulosin (FLOMAX) 0.4 MG capsule Take 0.4 mg by mouth every evening.   5/7/2024 at 2100    traMADol (ULTRAM) 50 MG Tab Take 50 mg by mouth every 6 hours as needed for Severe Pain.   >3 days at Unknown         Review of Systems  Review of Systems   Constitutional:  Negative for chills and fever.   Respiratory:  Negative for shortness of breath.    Cardiovascular:  Negative for chest pain.   Gastrointestinal:  Negative for abdominal pain, nausea and vomiting.   Genitourinary:  Positive for dysuria, flank pain and urgency.   All other systems reviewed and are negative.       Physical Exam  VITAL SIGNS: /70   Pulse 83   Temp 35.9 °C (96.7 °F) (Oral)   Resp 17   Ht 1.778 m (5' 10\")   Wt " Ilda Echols [facility] [phone] (home)   "111 kg (244 lb 11.4 oz)   SpO2 91%   BMI 35.11 kg/m²   GENERAL:  alert, in no acute distress  EYES: EOMI and normal accomodation  Neck: Supple  BACK: No CVA tenderness.   CHEST AND LUNGS: good air entry, no audible wheezes  CARDIOVASCULAR: Rate is regular, no peripheral edema.   ABDOMEN: Abdomen soft, nontender. No masses.  EXTREMITIES: Warm and well perfused without c/c/e  NEURO: No focal deficits  SKIN: Skin color, texture, turgor normal. No rashes, lesions, nor jaundice.      Labs:  Recent Labs     05/08/24  1547 05/09/24  0004   WBC 14.5* 9.6   RBC 7.41* 6.51*   HEMOGLOBIN 19.7* 17.3   HEMATOCRIT 58.5* 52.2*   MCV 78.9* 80.2*   MCH 26.6* 26.6*   MCHC 33.7 33.1   RDW 36.5 36.5   PLATELETCT 117* 103*   MPV 9.6 9.7     Recent Labs     05/08/24  1547 05/09/24  0004   SODIUM 136 137   POTASSIUM 4.4 4.0   CHLORIDE 98 99   CO2 27 30   GLUCOSE 134* 177*   BUN 15 15   CREATININE 1.26 1.37   CALCIUM 9.1 8.4         Glucose:  Lab Results   Component Value Date/Time    GLUCOSE 177 (H) 05/09/2024 12:04 AM    GLUCOSE 134 (H) 05/08/2024 03:47 PM     Coags:  No results found for: \"INR\"      Urinalysis:   Lab Results   Component Value Date/Time    COLORURINE Dark Yellow 05/08/2024 04:57 PM    CLARITY Cloudy (A) 05/08/2024 04:57 PM    SPECGRAVITY >=1.030 05/08/2024 04:57 PM    PHURINE 6.0 05/08/2024 04:57 PM    GLUCOSEUR Negative 05/08/2024 04:57 PM    KETONES Trace (A) 05/08/2024 04:57 PM    NITRITE Negative 05/08/2024 04:57 PM    OCCULTBLOOD Large (A) 05/08/2024 04:57 PM    RBCURINE 100-150 (A) 05/08/2024 04:57 PM    BACTERIA Few (A) 05/08/2024 04:57 PM    EPITHELCELL Few 05/08/2024 04:57 PM       Imaging:                                                     Assessment/Recommendation   48 y.o.male with 5mm left ureteral stone.  He understands the risk of inability to access ureter, the need for second procedures, the possibility of negative ureteroscopy, that he may have stent discomfort until this is removed, bleeding, " infection, ureteral injury or stricture, bladder injury, post op urinary retention requiring jay catheter, and the general pulmonary and cardiovascular risks associated with anesthesia.  After a full discussion of the alternatives risks and benefits of the procedure, the patient consented to proceeding with the planned procedure.     Consent obatined  Add on for Cystoscopy, left ureteroscopy, Laser lithotripsy and JJ stents (CULTS) with DR. Chacko. He is aware of this consult and dictated the plan of care.        Anila Snell, P.A.-C.   [address] [place] [address]   [phone]

## (undated) DEVICE — WIRE GUIDE SENSOR DUAL FLEX - 5/BX

## (undated) DEVICE — GOWN SURGICAL X-LARGE ULTRA - FILM-REINFORCED (20/CA)

## (undated) DEVICE — SLEEVE VASO CALF MED - (10PR/CA)

## (undated) DEVICE — HUMID-VENT HEAT AND MOISTURE EXCHANGE- (50/BX)

## (undated) DEVICE — Device

## (undated) DEVICE — ELECTRODE DUAL RETURN W/ CORD - (50/PK)

## (undated) DEVICE — TUBE CONNECTING SUCTION - CLEAR PLASTIC STERILE 72 IN (50EA/CA)

## (undated) DEVICE — WATER IRRIGATION STERILE 1000ML (12EA/CA)

## (undated) DEVICE — TOWEL STOP TIMEOUT SAFETY FLAG (40EA/CA)

## (undated) DEVICE — BAG URODRAIN WITH TUBING - (20/CA)

## (undated) DEVICE — BASKET ZERO 1.9FR X 120CM

## (undated) DEVICE — CATHETER URET DUAL LUMEN

## (undated) DEVICE — WATER IRRIG. STER 3000 ML - (4/CA)

## (undated) DEVICE — GLOVE BIOGEL SZ 7 SURGICAL PF LTX - (50PR/BX 4BX/CA)

## (undated) DEVICE — COVER LIGHT HANDLE FLEXIBLE - SOFT (2EA/PK 80PK/CA)

## (undated) DEVICE — SUCTION INSTRUMENT YANKAUER BULBOUS TIP W/O VENT (50EA/CA)

## (undated) DEVICE — SET LEADWIRE 5 LEAD BEDSIDE DISPOSABLE ECG (1SET OF 5/EA)

## (undated) DEVICE — SPONGE GAUZESTER 4 X 4 4PLY - (128PK/CA)

## (undated) DEVICE — JELLY SURGILUBE STERILE FOIL 5 GM (150EA/BX)

## (undated) DEVICE — SET IRRIGATION CYSTOSCOPY Y-TYPE L81 IN (20EA/CA)

## (undated) DEVICE — ELECTRODE BIPOLAR REGULAR CUTTING LOOP URO 24/26 FR (6EA/PK)

## (undated) DEVICE — CANISTER SUCTION RIGID RED 1500CC (40EA/CA)

## (undated) DEVICE — SODIUM CHL IRRIGATION 0.9% 1000ML (12EA/CA)

## (undated) DEVICE — SENSOR OXIMETER ADULT SPO2 RD SET (20EA/BX)

## (undated) DEVICE — GOWN WARMING STANDARD FLEX - (30/CA)

## (undated) DEVICE — GOWN SURGEONS X-LARGE - DISP. (30/CA)

## (undated) DEVICE — SET IRRIGATION CYSTOSCOPY TUBE L80 IN (20EA/CA)

## (undated) DEVICE — PACK CYSTOSCOPY III - (8/CA)

## (undated) DEVICE — SUTURE GENERAL

## (undated) DEVICE — BAG SPONGE COUNT 10.25 X 32 - BLUE (250/CA)